# Patient Record
Sex: MALE | Race: WHITE | Employment: FULL TIME | URBAN - METROPOLITAN AREA
[De-identification: names, ages, dates, MRNs, and addresses within clinical notes are randomized per-mention and may not be internally consistent; named-entity substitution may affect disease eponyms.]

---

## 2017-10-24 ENCOUNTER — APPOINTMENT (OUTPATIENT)
Dept: CARDIOLOGY | Facility: CLINIC | Age: 47
DRG: 247 | End: 2017-10-24
Payer: COMMERCIAL

## 2017-10-24 ENCOUNTER — HOSPITAL ENCOUNTER (INPATIENT)
Facility: CLINIC | Age: 47
LOS: 2 days | Discharge: HOME | DRG: 247 | End: 2017-10-26
Attending: EMERGENCY MEDICINE | Admitting: INTERNAL MEDICINE
Payer: COMMERCIAL

## 2017-10-24 ENCOUNTER — APPOINTMENT (OUTPATIENT)
Dept: GENERAL RADIOLOGY | Facility: CLINIC | Age: 47
DRG: 247 | End: 2017-10-24
Attending: EMERGENCY MEDICINE
Payer: COMMERCIAL

## 2017-10-24 ENCOUNTER — TRANSFERRED RECORDS (OUTPATIENT)
Dept: HEALTH INFORMATION MANAGEMENT | Facility: CLINIC | Age: 47
End: 2017-10-24

## 2017-10-24 DIAGNOSIS — I21.11 STEMI INVOLVING RIGHT CORONARY ARTERY (H): ICD-10-CM

## 2017-10-24 DIAGNOSIS — E78.5 HYPERLIPIDEMIA LDL GOAL <70: ICD-10-CM

## 2017-10-24 DIAGNOSIS — I21.11 ST ELEVATION MYOCARDIAL INFARCTION INVOLVING RIGHT CORONARY ARTERY (H): ICD-10-CM

## 2017-10-24 DIAGNOSIS — I21.3 STEMI (ST ELEVATION MYOCARDIAL INFARCTION) (H): Primary | ICD-10-CM

## 2017-10-24 LAB
ANION GAP SERPL CALCULATED.3IONS-SCNC: 14 MMOL/L (ref 3–14)
BASOPHILS # BLD AUTO: 0 10E9/L (ref 0–0.2)
BASOPHILS NFR BLD AUTO: 0.3 %
BUN SERPL-MCNC: 15 MG/DL (ref 7–30)
CALCIUM SERPL-MCNC: 9 MG/DL (ref 8.5–10.1)
CHLORIDE SERPL-SCNC: 99 MMOL/L (ref 94–109)
CO2 SERPL-SCNC: 25 MMOL/L (ref 20–32)
CREAT SERPL-MCNC: 0.96 MG/DL (ref 0.66–1.25)
DIFFERENTIAL METHOD BLD: NORMAL
EOSINOPHIL # BLD AUTO: 0.1 10E9/L (ref 0–0.7)
EOSINOPHIL NFR BLD AUTO: 1 %
ERYTHROCYTE [DISTWIDTH] IN BLOOD BY AUTOMATED COUNT: 13.3 % (ref 10–15)
GFR SERPL CREATININE-BSD FRML MDRD: 83 ML/MIN/1.7M2
GLUCOSE SERPL-MCNC: 147 MG/DL (ref 70–99)
HCT VFR BLD AUTO: 41.9 % (ref 40–53)
HGB BLD-MCNC: 15 G/DL (ref 13.3–17.7)
IMM GRANULOCYTES # BLD: 0 10E9/L (ref 0–0.4)
IMM GRANULOCYTES NFR BLD: 0.4 %
INR PPP: 0.92 (ref 0.86–1.14)
INTERPRETATION ECG - MUSE: NORMAL
KCT BLD-ACNC: 354 SEC (ref 105–167)
LYMPHOCYTES # BLD AUTO: 4.1 10E9/L (ref 0.8–5.3)
LYMPHOCYTES NFR BLD AUTO: 39.2 %
MCH RBC QN AUTO: 30 PG (ref 26.5–33)
MCHC RBC AUTO-ENTMCNC: 35.8 G/DL (ref 31.5–36.5)
MCV RBC AUTO: 84 FL (ref 78–100)
MONOCYTES # BLD AUTO: 0.8 10E9/L (ref 0–1.3)
MONOCYTES NFR BLD AUTO: 7.6 %
NEUTROPHILS # BLD AUTO: 5.3 10E9/L (ref 1.6–8.3)
NEUTROPHILS NFR BLD AUTO: 51.5 %
NRBC # BLD AUTO: 0 10*3/UL
NRBC BLD AUTO-RTO: 0 /100
PLATELET # BLD AUTO: 277 10E9/L (ref 150–450)
POTASSIUM SERPL-SCNC: 3 MMOL/L (ref 3.4–5.3)
RBC # BLD AUTO: 5 10E12/L (ref 4.4–5.9)
SODIUM SERPL-SCNC: 138 MMOL/L (ref 133–144)
TROPONIN I SERPL-MCNC: 18.61 UG/L (ref 0–0.04)
TROPONIN I SERPL-MCNC: 2.92 UG/L (ref 0–0.04)
TROPONIN I SERPL-MCNC: <0.015 UG/L (ref 0–0.04)
WBC # BLD AUTO: 10.4 10E9/L (ref 4–11)

## 2017-10-24 PROCEDURE — B2111ZZ FLUOROSCOPY OF MULTIPLE CORONARY ARTERIES USING LOW OSMOLAR CONTRAST: ICD-10-PCS | Performed by: INTERNAL MEDICINE

## 2017-10-24 PROCEDURE — C1769 GUIDE WIRE: HCPCS

## 2017-10-24 PROCEDURE — 027035Z DILATION OF CORONARY ARTERY, ONE ARTERY WITH TWO DRUG-ELUTING INTRALUMINAL DEVICES, PERCUTANEOUS APPROACH: ICD-10-PCS | Performed by: INTERNAL MEDICINE

## 2017-10-24 PROCEDURE — 99152 MOD SED SAME PHYS/QHP 5/>YRS: CPT | Mod: GC | Performed by: INTERNAL MEDICINE

## 2017-10-24 PROCEDURE — 27210946 ZZH KIT HC TOTES DISP CR8

## 2017-10-24 PROCEDURE — 27211089 ZZH KIT ACIST INJECTOR CR3

## 2017-10-24 PROCEDURE — 21000000 ZZH R&B IMCU HEART CARE

## 2017-10-24 PROCEDURE — 71010 XR CHEST PORT 1 VW: CPT

## 2017-10-24 PROCEDURE — 25000132 ZZH RX MED GY IP 250 OP 250 PS 637

## 2017-10-24 PROCEDURE — 92941 PRQ TRLML REVSC TOT OCCL AMI: CPT | Mod: RC | Performed by: INTERNAL MEDICINE

## 2017-10-24 PROCEDURE — 25000128 H RX IP 250 OP 636: Performed by: PHYSICIAN ASSISTANT

## 2017-10-24 PROCEDURE — C1887 CATHETER, GUIDING: HCPCS

## 2017-10-24 PROCEDURE — 27210892 ZZH CATH CR4

## 2017-10-24 PROCEDURE — 80048 BASIC METABOLIC PNL TOTAL CA: CPT | Performed by: EMERGENCY MEDICINE

## 2017-10-24 PROCEDURE — 4A023N7 MEASUREMENT OF CARDIAC SAMPLING AND PRESSURE, LEFT HEART, PERCUTANEOUS APPROACH: ICD-10-PCS | Performed by: INTERNAL MEDICINE

## 2017-10-24 PROCEDURE — 93010 ELECTROCARDIOGRAM REPORT: CPT | Performed by: INTERNAL MEDICINE

## 2017-10-24 PROCEDURE — C1874 STENT, COATED/COV W/DEL SYS: HCPCS

## 2017-10-24 PROCEDURE — 27210856 ZZH ACCESS HEART CATH CR2

## 2017-10-24 PROCEDURE — 25000128 H RX IP 250 OP 636: Performed by: INTERNAL MEDICINE

## 2017-10-24 PROCEDURE — 27210759 ZZH DEVICE INFLATION CR6

## 2017-10-24 PROCEDURE — 93458 L HRT ARTERY/VENTRICLE ANGIO: CPT | Mod: XU

## 2017-10-24 PROCEDURE — 84484 ASSAY OF TROPONIN QUANT: CPT | Performed by: EMERGENCY MEDICINE

## 2017-10-24 PROCEDURE — C9606 PERC D-E COR REVASC W AMI S: HCPCS

## 2017-10-24 PROCEDURE — 93458 L HRT ARTERY/VENTRICLE ANGIO: CPT | Mod: 26 | Performed by: INTERNAL MEDICINE

## 2017-10-24 PROCEDURE — 25000128 H RX IP 250 OP 636: Performed by: EMERGENCY MEDICINE

## 2017-10-24 PROCEDURE — C1757 CATH, THROMBECTOMY/EMBOLECT: HCPCS

## 2017-10-24 PROCEDURE — 25000132 ZZH RX MED GY IP 250 OP 250 PS 637: Performed by: EMERGENCY MEDICINE

## 2017-10-24 PROCEDURE — 99152 MOD SED SAME PHYS/QHP 5/>YRS: CPT

## 2017-10-24 PROCEDURE — 25000125 ZZHC RX 250: Performed by: INTERNAL MEDICINE

## 2017-10-24 PROCEDURE — 99291 CRITICAL CARE FIRST HOUR: CPT | Performed by: INTERNAL MEDICINE

## 2017-10-24 PROCEDURE — 93005 ELECTROCARDIOGRAM TRACING: CPT

## 2017-10-24 PROCEDURE — 36415 COLL VENOUS BLD VENIPUNCTURE: CPT

## 2017-10-24 PROCEDURE — 25000132 ZZH RX MED GY IP 250 OP 250 PS 637: Performed by: INTERNAL MEDICINE

## 2017-10-24 PROCEDURE — 85025 COMPLETE CBC W/AUTO DIFF WBC: CPT | Performed by: EMERGENCY MEDICINE

## 2017-10-24 PROCEDURE — 85610 PROTHROMBIN TIME: CPT | Performed by: EMERGENCY MEDICINE

## 2017-10-24 PROCEDURE — 85347 COAGULATION TIME ACTIVATED: CPT

## 2017-10-24 PROCEDURE — 99285 EMERGENCY DEPT VISIT HI MDM: CPT | Mod: 25

## 2017-10-24 PROCEDURE — 27210795 ZZH PAD DEFIB QUICK CR4

## 2017-10-24 PROCEDURE — 84484 ASSAY OF TROPONIN QUANT: CPT

## 2017-10-24 PROCEDURE — C1725 CATH, TRANSLUMIN NON-LASER: HCPCS

## 2017-10-24 PROCEDURE — 99153 MOD SED SAME PHYS/QHP EA: CPT

## 2017-10-24 PROCEDURE — 27210787 ZZH MANIFOLD CR2

## 2017-10-24 PROCEDURE — 27210914 ZZH SHEATH CR8

## 2017-10-24 RX ORDER — ASPIRIN 81 MG/1
324 TABLET, CHEWABLE ORAL ONCE
Status: DISCONTINUED | OUTPATIENT
Start: 2017-10-24 | End: 2017-10-24

## 2017-10-24 RX ORDER — NITROGLYCERIN 5 MG/ML
100-500 VIAL (ML) INTRAVENOUS
Status: COMPLETED | OUTPATIENT
Start: 2017-10-24 | End: 2017-10-24

## 2017-10-24 RX ORDER — MORPHINE SULFATE 2 MG/ML
1-2 INJECTION, SOLUTION INTRAMUSCULAR; INTRAVENOUS EVERY 5 MIN PRN
Status: DISCONTINUED | OUTPATIENT
Start: 2017-10-24 | End: 2017-10-24 | Stop reason: HOSPADM

## 2017-10-24 RX ORDER — IOPAMIDOL 755 MG/ML
150 INJECTION, SOLUTION INTRAVASCULAR ONCE
Status: COMPLETED | OUTPATIENT
Start: 2017-10-24 | End: 2017-10-24

## 2017-10-24 RX ORDER — ASPIRIN 81 MG/1
81-324 TABLET, CHEWABLE ORAL
Status: DISCONTINUED | OUTPATIENT
Start: 2017-10-24 | End: 2017-10-24 | Stop reason: HOSPADM

## 2017-10-24 RX ORDER — POTASSIUM CHLORIDE 1.5 G/1.58G
20-40 POWDER, FOR SOLUTION ORAL
Status: DISCONTINUED | OUTPATIENT
Start: 2017-10-24 | End: 2017-10-26 | Stop reason: HOSPADM

## 2017-10-24 RX ORDER — POTASSIUM CL/LIDO/0.9 % NACL 10MEQ/0.1L
10 INTRAVENOUS SOLUTION, PIGGYBACK (ML) INTRAVENOUS
Status: DISCONTINUED | OUTPATIENT
Start: 2017-10-24 | End: 2017-10-26 | Stop reason: HOSPADM

## 2017-10-24 RX ORDER — ONDANSETRON 2 MG/ML
4 INJECTION INTRAMUSCULAR; INTRAVENOUS EVERY 4 HOURS PRN
Status: DISCONTINUED | OUTPATIENT
Start: 2017-10-24 | End: 2017-10-24 | Stop reason: HOSPADM

## 2017-10-24 RX ORDER — PHENYLEPHRINE HCL IN 0.9% NACL 1 MG/10 ML
20-100 SYRINGE (ML) INTRAVENOUS
Status: DISCONTINUED | OUTPATIENT
Start: 2017-10-24 | End: 2017-10-24 | Stop reason: HOSPADM

## 2017-10-24 RX ORDER — ONDANSETRON 2 MG/ML
4 INJECTION INTRAMUSCULAR; INTRAVENOUS EVERY 6 HOURS PRN
Status: DISCONTINUED | OUTPATIENT
Start: 2017-10-24 | End: 2017-10-26 | Stop reason: HOSPADM

## 2017-10-24 RX ORDER — POTASSIUM CHLORIDE 1500 MG/1
20-40 TABLET, EXTENDED RELEASE ORAL
Status: DISCONTINUED | OUTPATIENT
Start: 2017-10-24 | End: 2017-10-26 | Stop reason: HOSPADM

## 2017-10-24 RX ORDER — POTASSIUM CHLORIDE 29.8 MG/ML
20 INJECTION INTRAVENOUS
Status: DISCONTINUED | OUTPATIENT
Start: 2017-10-24 | End: 2017-10-24 | Stop reason: HOSPADM

## 2017-10-24 RX ORDER — HYDRALAZINE HYDROCHLORIDE 20 MG/ML
10-20 INJECTION INTRAMUSCULAR; INTRAVENOUS
Status: DISCONTINUED | OUTPATIENT
Start: 2017-10-24 | End: 2017-10-24 | Stop reason: HOSPADM

## 2017-10-24 RX ORDER — FUROSEMIDE 10 MG/ML
20-100 INJECTION INTRAMUSCULAR; INTRAVENOUS
Status: DISCONTINUED | OUTPATIENT
Start: 2017-10-24 | End: 2017-10-24 | Stop reason: HOSPADM

## 2017-10-24 RX ORDER — AMLODIPINE BESYLATE 2.5 MG/1
2.5 TABLET ORAL
Status: DISCONTINUED | OUTPATIENT
Start: 2017-10-24 | End: 2017-10-26 | Stop reason: HOSPADM

## 2017-10-24 RX ORDER — NITROGLYCERIN 20 MG/100ML
.07-2 INJECTION INTRAVENOUS CONTINUOUS PRN
Status: DISCONTINUED | OUTPATIENT
Start: 2017-10-24 | End: 2017-10-24 | Stop reason: HOSPADM

## 2017-10-24 RX ORDER — PROMETHAZINE HYDROCHLORIDE 25 MG/ML
6.25-25 INJECTION, SOLUTION INTRAMUSCULAR; INTRAVENOUS EVERY 4 HOURS PRN
Status: DISCONTINUED | OUTPATIENT
Start: 2017-10-24 | End: 2017-10-24 | Stop reason: HOSPADM

## 2017-10-24 RX ORDER — LISINOPRIL/HYDROCHLOROTHIAZIDE 10-12.5 MG
1 TABLET ORAL AT BEDTIME
Status: ON HOLD | COMMUNITY
End: 2017-10-26

## 2017-10-24 RX ORDER — ADENOSINE 3 MG/ML
12-12000 INJECTION, SOLUTION INTRAVENOUS
Status: DISCONTINUED | OUTPATIENT
Start: 2017-10-24 | End: 2017-10-24 | Stop reason: HOSPADM

## 2017-10-24 RX ORDER — LORAZEPAM 2 MG/ML
.5-2 INJECTION INTRAMUSCULAR EVERY 4 HOURS PRN
Status: DISCONTINUED | OUTPATIENT
Start: 2017-10-24 | End: 2017-10-24 | Stop reason: HOSPADM

## 2017-10-24 RX ORDER — POTASSIUM CHLORIDE 7.45 MG/ML
10 INJECTION INTRAVENOUS
Status: DISCONTINUED | OUTPATIENT
Start: 2017-10-24 | End: 2017-10-26 | Stop reason: HOSPADM

## 2017-10-24 RX ORDER — ACETAMINOPHEN 325 MG/1
325-650 TABLET ORAL EVERY 4 HOURS PRN
Status: DISCONTINUED | OUTPATIENT
Start: 2017-10-24 | End: 2017-10-26 | Stop reason: HOSPADM

## 2017-10-24 RX ORDER — NICARDIPINE HYDROCHLORIDE 2.5 MG/ML
100 INJECTION INTRAVENOUS
Status: DISCONTINUED | OUTPATIENT
Start: 2017-10-24 | End: 2017-10-24 | Stop reason: HOSPADM

## 2017-10-24 RX ORDER — NALOXONE HYDROCHLORIDE 0.4 MG/ML
0.4 INJECTION, SOLUTION INTRAMUSCULAR; INTRAVENOUS; SUBCUTANEOUS EVERY 5 MIN PRN
Status: DISCONTINUED | OUTPATIENT
Start: 2017-10-24 | End: 2017-10-24 | Stop reason: HOSPADM

## 2017-10-24 RX ORDER — FLUMAZENIL 0.1 MG/ML
0.2 INJECTION, SOLUTION INTRAVENOUS
Status: DISCONTINUED | OUTPATIENT
Start: 2017-10-24 | End: 2017-10-24 | Stop reason: HOSPADM

## 2017-10-24 RX ORDER — ATROPINE SULFATE 0.1 MG/ML
.5-1 INJECTION INTRAVENOUS
Status: DISCONTINUED | OUTPATIENT
Start: 2017-10-24 | End: 2017-10-24 | Stop reason: HOSPADM

## 2017-10-24 RX ORDER — DIPHENHYDRAMINE HYDROCHLORIDE 50 MG/ML
25-50 INJECTION INTRAMUSCULAR; INTRAVENOUS
Status: DISCONTINUED | OUTPATIENT
Start: 2017-10-24 | End: 2017-10-24 | Stop reason: HOSPADM

## 2017-10-24 RX ORDER — METOPROLOL TARTRATE 1 MG/ML
5 INJECTION, SOLUTION INTRAVENOUS EVERY 5 MIN PRN
Status: DISCONTINUED | OUTPATIENT
Start: 2017-10-24 | End: 2017-10-24 | Stop reason: HOSPADM

## 2017-10-24 RX ORDER — CLOPIDOGREL 300 MG/1
300-600 TABLET, FILM COATED ORAL
Status: DISCONTINUED | OUTPATIENT
Start: 2017-10-24 | End: 2017-10-24 | Stop reason: HOSPADM

## 2017-10-24 RX ORDER — LIDOCAINE HYDROCHLORIDE 10 MG/ML
30 INJECTION, SOLUTION EPIDURAL; INFILTRATION; INTRACAUDAL; PERINEURAL
Status: DISCONTINUED | OUTPATIENT
Start: 2017-10-24 | End: 2017-10-24 | Stop reason: HOSPADM

## 2017-10-24 RX ORDER — SODIUM NITROPRUSSIDE 25 MG/ML
100-200 INJECTION INTRAVENOUS
Status: DISCONTINUED | OUTPATIENT
Start: 2017-10-24 | End: 2017-10-24 | Stop reason: HOSPADM

## 2017-10-24 RX ORDER — ASPIRIN 81 MG/1
81 TABLET ORAL DAILY
Status: DISCONTINUED | OUTPATIENT
Start: 2017-10-24 | End: 2017-10-26 | Stop reason: HOSPADM

## 2017-10-24 RX ORDER — LIDOCAINE HYDROCHLORIDE 10 MG/ML
1-10 INJECTION, SOLUTION EPIDURAL; INFILTRATION; INTRACAUDAL; PERINEURAL
Status: COMPLETED | OUTPATIENT
Start: 2017-10-24 | End: 2017-10-24

## 2017-10-24 RX ORDER — HEPARIN SODIUM 1000 [USP'U]/ML
1000-10000 INJECTION, SOLUTION INTRAVENOUS; SUBCUTANEOUS EVERY 5 MIN PRN
Status: DISCONTINUED | OUTPATIENT
Start: 2017-10-24 | End: 2017-10-24 | Stop reason: HOSPADM

## 2017-10-24 RX ORDER — FLUMAZENIL 0.1 MG/ML
0.2 INJECTION, SOLUTION INTRAVENOUS
Status: ACTIVE | OUTPATIENT
Start: 2017-10-24 | End: 2017-10-25

## 2017-10-24 RX ORDER — ATROPINE SULFATE 0.1 MG/ML
0.5 INJECTION INTRAVENOUS EVERY 5 MIN PRN
Status: ACTIVE | OUTPATIENT
Start: 2017-10-24 | End: 2017-10-25

## 2017-10-24 RX ORDER — LIDOCAINE 40 MG/G
CREAM TOPICAL
Status: DISCONTINUED | OUTPATIENT
Start: 2017-10-24 | End: 2017-10-26 | Stop reason: HOSPADM

## 2017-10-24 RX ORDER — PRASUGREL 10 MG/1
10-60 TABLET, FILM COATED ORAL
Status: DISCONTINUED | OUTPATIENT
Start: 2017-10-24 | End: 2017-10-24 | Stop reason: HOSPADM

## 2017-10-24 RX ORDER — LISINOPRIL 5 MG/1
5 TABLET ORAL DAILY
Status: DISCONTINUED | OUTPATIENT
Start: 2017-10-24 | End: 2017-10-24

## 2017-10-24 RX ORDER — DOBUTAMINE HYDROCHLORIDE 200 MG/100ML
2-20 INJECTION INTRAVENOUS CONTINUOUS PRN
Status: DISCONTINUED | OUTPATIENT
Start: 2017-10-24 | End: 2017-10-24 | Stop reason: HOSPADM

## 2017-10-24 RX ORDER — HYDROCODONE BITARTRATE AND ACETAMINOPHEN 5; 325 MG/1; MG/1
1-2 TABLET ORAL EVERY 4 HOURS PRN
Status: DISCONTINUED | OUTPATIENT
Start: 2017-10-24 | End: 2017-10-26 | Stop reason: HOSPADM

## 2017-10-24 RX ORDER — NIFEDIPINE 10 MG/1
10 CAPSULE ORAL
Status: DISCONTINUED | OUTPATIENT
Start: 2017-10-24 | End: 2017-10-24 | Stop reason: HOSPADM

## 2017-10-24 RX ORDER — METHYLPREDNISOLONE SODIUM SUCCINATE 125 MG/2ML
125 INJECTION, POWDER, LYOPHILIZED, FOR SOLUTION INTRAMUSCULAR; INTRAVENOUS
Status: DISCONTINUED | OUTPATIENT
Start: 2017-10-24 | End: 2017-10-24 | Stop reason: HOSPADM

## 2017-10-24 RX ORDER — NALOXONE HYDROCHLORIDE 0.4 MG/ML
.1-.4 INJECTION, SOLUTION INTRAMUSCULAR; INTRAVENOUS; SUBCUTANEOUS
Status: DISCONTINUED | OUTPATIENT
Start: 2017-10-24 | End: 2017-10-26 | Stop reason: HOSPADM

## 2017-10-24 RX ORDER — PROTAMINE SULFATE 10 MG/ML
1-5 INJECTION, SOLUTION INTRAVENOUS
Status: DISCONTINUED | OUTPATIENT
Start: 2017-10-24 | End: 2017-10-24 | Stop reason: HOSPADM

## 2017-10-24 RX ORDER — SODIUM CHLORIDE 9 MG/ML
INJECTION, SOLUTION INTRAVENOUS CONTINUOUS
Status: ACTIVE | OUTPATIENT
Start: 2017-10-24 | End: 2017-10-24

## 2017-10-24 RX ORDER — NALOXONE HYDROCHLORIDE 0.4 MG/ML
.2-.4 INJECTION, SOLUTION INTRAMUSCULAR; INTRAVENOUS; SUBCUTANEOUS
Status: ACTIVE | OUTPATIENT
Start: 2017-10-24 | End: 2017-10-25

## 2017-10-24 RX ORDER — DEXTROSE MONOHYDRATE 25 G/50ML
12.5-5 INJECTION, SOLUTION INTRAVENOUS EVERY 30 MIN PRN
Status: DISCONTINUED | OUTPATIENT
Start: 2017-10-24 | End: 2017-10-24 | Stop reason: HOSPADM

## 2017-10-24 RX ORDER — EPINEPHRINE 1 MG/ML
0.3 INJECTION, SOLUTION, CONCENTRATE INTRAVENOUS
Status: DISCONTINUED | OUTPATIENT
Start: 2017-10-24 | End: 2017-10-24 | Stop reason: HOSPADM

## 2017-10-24 RX ORDER — VERAPAMIL HYDROCHLORIDE 2.5 MG/ML
1-2.5 INJECTION, SOLUTION INTRAVENOUS
Status: COMPLETED | OUTPATIENT
Start: 2017-10-24 | End: 2017-10-24

## 2017-10-24 RX ORDER — CLOPIDOGREL BISULFATE 75 MG/1
75 TABLET ORAL
Status: DISCONTINUED | OUTPATIENT
Start: 2017-10-24 | End: 2017-10-24 | Stop reason: HOSPADM

## 2017-10-24 RX ORDER — NITROGLYCERIN 5 MG/ML
100-200 VIAL (ML) INTRAVENOUS
Status: DISCONTINUED | OUTPATIENT
Start: 2017-10-24 | End: 2017-10-24 | Stop reason: HOSPADM

## 2017-10-24 RX ORDER — NITROGLYCERIN 0.4 MG/1
0.4 TABLET SUBLINGUAL EVERY 5 MIN PRN
Status: DISCONTINUED | OUTPATIENT
Start: 2017-10-24 | End: 2017-10-24 | Stop reason: HOSPADM

## 2017-10-24 RX ORDER — POTASSIUM CHLORIDE 29.8 MG/ML
20 INJECTION INTRAVENOUS
Status: DISCONTINUED | OUTPATIENT
Start: 2017-10-24 | End: 2017-10-24

## 2017-10-24 RX ORDER — ENALAPRILAT 1.25 MG/ML
1.25-2.5 INJECTION INTRAVENOUS
Status: DISCONTINUED | OUTPATIENT
Start: 2017-10-24 | End: 2017-10-24 | Stop reason: HOSPADM

## 2017-10-24 RX ORDER — HEPARIN SODIUM 1000 [USP'U]/ML
INJECTION, SOLUTION INTRAVENOUS; SUBCUTANEOUS
Status: DISCONTINUED
Start: 2017-10-24 | End: 2017-10-24 | Stop reason: HOSPADM

## 2017-10-24 RX ORDER — ATORVASTATIN CALCIUM 40 MG/1
40 TABLET, FILM COATED ORAL DAILY
Status: DISCONTINUED | OUTPATIENT
Start: 2017-10-24 | End: 2017-10-25

## 2017-10-24 RX ORDER — FENTANYL CITRATE 50 UG/ML
25-50 INJECTION, SOLUTION INTRAMUSCULAR; INTRAVENOUS
Status: ACTIVE | OUTPATIENT
Start: 2017-10-24 | End: 2017-10-25

## 2017-10-24 RX ORDER — CARVEDILOL 6.25 MG/1
6.25 TABLET ORAL 2 TIMES DAILY
Status: DISCONTINUED | OUTPATIENT
Start: 2017-10-24 | End: 2017-10-26 | Stop reason: HOSPADM

## 2017-10-24 RX ORDER — PROTAMINE SULFATE 10 MG/ML
25-100 INJECTION, SOLUTION INTRAVENOUS EVERY 5 MIN PRN
Status: DISCONTINUED | OUTPATIENT
Start: 2017-10-24 | End: 2017-10-24 | Stop reason: HOSPADM

## 2017-10-24 RX ORDER — BUPIVACAINE HYDROCHLORIDE 2.5 MG/ML
1-10 INJECTION, SOLUTION EPIDURAL; INFILTRATION; INTRACAUDAL
Status: DISCONTINUED | OUTPATIENT
Start: 2017-10-24 | End: 2017-10-24 | Stop reason: HOSPADM

## 2017-10-24 RX ORDER — NITROGLYCERIN 0.4 MG/1
0.4 TABLET SUBLINGUAL EVERY 5 MIN PRN
Status: DISCONTINUED | OUTPATIENT
Start: 2017-10-24 | End: 2017-10-26 | Stop reason: HOSPADM

## 2017-10-24 RX ORDER — FENTANYL CITRATE 50 UG/ML
25-50 INJECTION, SOLUTION INTRAMUSCULAR; INTRAVENOUS
Status: DISCONTINUED | OUTPATIENT
Start: 2017-10-24 | End: 2017-10-24 | Stop reason: HOSPADM

## 2017-10-24 RX ORDER — ONDANSETRON 4 MG/1
4 TABLET, ORALLY DISINTEGRATING ORAL EVERY 6 HOURS PRN
Status: DISCONTINUED | OUTPATIENT
Start: 2017-10-24 | End: 2017-10-26 | Stop reason: HOSPADM

## 2017-10-24 RX ORDER — ASPIRIN 325 MG
325 TABLET ORAL
Status: DISCONTINUED | OUTPATIENT
Start: 2017-10-24 | End: 2017-10-24 | Stop reason: HOSPADM

## 2017-10-24 RX ORDER — DOPAMINE HYDROCHLORIDE 160 MG/100ML
2-20 INJECTION, SOLUTION INTRAVENOUS CONTINUOUS PRN
Status: DISCONTINUED | OUTPATIENT
Start: 2017-10-24 | End: 2017-10-24 | Stop reason: HOSPADM

## 2017-10-24 RX ORDER — POTASSIUM CHLORIDE 7.45 MG/ML
10 INJECTION INTRAVENOUS
Status: DISCONTINUED | OUTPATIENT
Start: 2017-10-24 | End: 2017-10-24 | Stop reason: HOSPADM

## 2017-10-24 RX ORDER — LISINOPRIL 10 MG/1
10 TABLET ORAL 2 TIMES DAILY
Status: DISCONTINUED | OUTPATIENT
Start: 2017-10-24 | End: 2017-10-26 | Stop reason: HOSPADM

## 2017-10-24 RX ADMIN — MIDAZOLAM HYDROCHLORIDE 1 MG: 1 INJECTION, SOLUTION INTRAMUSCULAR; INTRAVENOUS at 13:21

## 2017-10-24 RX ADMIN — Medication 10 MEQ: at 23:44

## 2017-10-24 RX ADMIN — TICAGRELOR 90 MG: 90 TABLET ORAL at 23:44

## 2017-10-24 RX ADMIN — VERAPAMIL HYDROCHLORIDE 2.5 MG: 2.5 INJECTION, SOLUTION INTRAVENOUS at 13:21

## 2017-10-24 RX ADMIN — CARVEDILOL 6.25 MG: 6.25 TABLET, FILM COATED ORAL at 20:25

## 2017-10-24 RX ADMIN — FENTANYL CITRATE 50 MCG: 50 INJECTION, SOLUTION INTRAMUSCULAR; INTRAVENOUS at 13:20

## 2017-10-24 RX ADMIN — FENTANYL CITRATE 50 MCG: 50 INJECTION, SOLUTION INTRAMUSCULAR; INTRAVENOUS at 13:39

## 2017-10-24 RX ADMIN — Medication 5000 UNITS: at 13:20

## 2017-10-24 RX ADMIN — NITROGLYCERIN 200 MCG: 5 INJECTION, SOLUTION INTRAVENOUS at 13:20

## 2017-10-24 RX ADMIN — Medication 10 MEQ: at 22:29

## 2017-10-24 RX ADMIN — MIDAZOLAM HYDROCHLORIDE 1 MG: 1 INJECTION, SOLUTION INTRAMUSCULAR; INTRAVENOUS at 13:39

## 2017-10-24 RX ADMIN — LISINOPRIL 5 MG: 5 TABLET ORAL at 16:56

## 2017-10-24 RX ADMIN — IOPAMIDOL 150 ML: 755 INJECTION, SOLUTION INTRAVASCULAR at 14:15

## 2017-10-24 RX ADMIN — LISINOPRIL 10 MG: 10 TABLET ORAL at 22:28

## 2017-10-24 RX ADMIN — RANITIDINE 150 MG: 150 TABLET ORAL at 20:25

## 2017-10-24 RX ADMIN — HEPARIN SODIUM 7000 UNITS: 1000 INJECTION, SOLUTION INTRAVENOUS; SUBCUTANEOUS at 13:00

## 2017-10-24 RX ADMIN — TICAGRELOR 180 MG: 90 TABLET ORAL at 12:59

## 2017-10-24 RX ADMIN — ATORVASTATIN CALCIUM 40 MG: 40 TABLET, FILM COATED ORAL at 20:26

## 2017-10-24 RX ADMIN — LIDOCAINE HYDROCHLORIDE 10 MG: 10 INJECTION, SOLUTION EPIDURAL; INFILTRATION; INTRACAUDAL; PERINEURAL at 13:20

## 2017-10-24 ASSESSMENT — ENCOUNTER SYMPTOMS: MYALGIAS: 1

## 2017-10-24 ASSESSMENT — ACTIVITIES OF DAILY LIVING (ADL)
DRESS: 0-->INDEPENDENT
TOILETING: 0-->INDEPENDENT
RETIRED_EATING: 0-->INDEPENDENT
BATHING: 0-->INDEPENDENT
RETIRED_COMMUNICATION: 0-->UNDERSTANDS/COMMUNICATES WITHOUT DIFFICULTY
FALL_HISTORY_WITHIN_LAST_SIX_MONTHS: NO
COGNITION: 0 - NO COGNITION ISSUES REPORTED
SWALLOWING: 0-->SWALLOWS FOODS/LIQUIDS WITHOUT DIFFICULTY
AMBULATION: 0-->INDEPENDENT
TRANSFERRING: 0-->INDEPENDENT

## 2017-10-24 NOTE — IP AVS SNAPSHOT
[facility]    [address]    [facility] [address]    Phone:  [phone]                                       After Visit Summary   10/24/2017    Sachin Xavier    MRN: [id number]           After Visit Summary Signature Page     I have received my discharge instructions, and my questions have been answered. I have discussed any challenges I see with this plan with the nurse or doctor.    ..........................................................................................................................................  Patient/Patient Representative Signature      ..........................................................................................................................................  Patient Representative Print Name and Relationship to Patient    ..................................................               ................................................  Date                                            Time    ..........................................................................................................................................  Reviewed by Signature/Title    ...................................................              ..............................................  Date                                                            Time

## 2017-10-24 NOTE — PHARMACY-ADMISSION MEDICATION HISTORY
Admission medication history interview status for the 10/24/2017  admission is complete. See EPIC admission navigator for prior to admission medications     Medication history source reliability:Good    Actions taken by pharmacist (provider contacted, etc):called [facility] for meds     Additional medication history information not noted on PTA med list : both of them    Medication reconciliation/reorder completed by provider prior to medication history? No    Time spent in this activity: 7 min    Prior to Admission medications    Medication Sig Last Dose Taking? Auth Provider   ATORVASTATIN CALCIUM PO Take 40 mg by mouth At Bedtime  Yes Unknown, Entered By History   lisinopril-hydrochlorothiazide (PRINZIDE/ZESTORETIC) 10-12.5 MG per tablet Take 1 tablet by mouth At Bedtime  Yes Unknown, Entered By History

## 2017-10-24 NOTE — ED PROVIDER NOTES
"  History     Chief Complaint:  Chest Pain     HPI   Sachin Xavier is a 47 year old male who presents to the emergency department today via EMS for evaluation of chest pain, left shoulder and arm pain that started 40 minutes prior to arrival. EMS EKG showed inferior STEMI and the patient was given 324 ASA and 3 nitroglycerin doses. The last blood pressure taken by EMS was 122/76. He is still in pain here, but is feeling improved. The patient has a family history of MI, but the patient does not have a past medical history of MI.    Allergies:  No Known Drug Allergies      Medications:   No current outpatient prescriptions on file.    Past Medical History:    Hyperlipidemia  Hypertension     Past Surgical History:    Hernia repair surgery  ACL repair    Family History:    MI    Social History:  The patient was accompanied to the ED by EMS.  Smoking Status: never  Alcohol Use: Yes     Review of Systems   Cardiovascular: Positive for chest pain.   Musculoskeletal: Positive for myalgias (left arm).   All other systems reviewed and are negative.    Physical Exam   First Vitals:  BP: 107/87  Heart Rate: 60  Temp: 98.9  F (37.2  C)  Resp: 10  Height: 172.7 cm (5' 8\")  Weight: 97.2 kg (214 lb 4.6 oz)  SpO2: 99 %    Physical Exam  Nursing note and vitals reviewed.  Constitutional:  Appears diaphoretic and pale.  uncomfortable.  HENT:   Nose:    Nose normal.   Mouth/Throat:  Mucosa is moist.  Eyes:    Conjunctivae are normal.      Pupils are equal, round, and reactive to light.      Right eye exhibits no discharge. Left eye exhibits no discharge.      No scleral icterus.   Cardiovascular:  Normal rate, regular rhythm.      Normal heart sounds and intact distal pulses.       No murmur heard.  Pulmonary/Chest:  Effort normal and breath sounds normal. No respiratory distress.     No wheezes. No rales. No chest wall tenderness. No stridor.   Abdominal:   Soft. No distension and no mass. No tenderness.      No rebound and no " guarding. No flank pain.  Musculoskeletal:  Normal range of motion.      No edema and no tenderness. No leg edema.                                      Neck supple, no midline cervical tenderness.   Neurological:   Alert, awake. No focal weakness.  Skin:    Diaphoretic. Pallor.     No erythema. Skin is warm and dry. No rash noted.  Psychiatric:   Behavior is normal. Judgment and thought content normal.     Emergency Department Course     ECG:  ECG taken at 1258, ECG read at 1259  Normal sinus rhythm  ST elevation consider inferior injury or acute infarct  Acute MI/STEMI  Consider right ventricular involvement in acute inferior infarct  Abnormal ECG  Rate 65 bpm. KY interval 184. QRS duration 88. QT/QTc 402/418. P-R-T axes 40,83,108.     Imaging:  Radiology findings were communicated with the patient who voiced understanding of the findings.  XR Chest Port 1 View  Transcutaneous pacer leads are seen. Cardiac silhouette  and pulmonary vasculature are within normal limits. No focal airspace  disease, pleural effusion or pneumothorax.  MARLEN OAKES MD    Laboratory:  Laboratory findings were communicated with the patient who voiced understanding of the findings.  Troponin (Collected 1258): <0.015   INR: 0.92   CBC: WBC 10.4, HGB 15.0,   BMP: K 3.0 (L), Glucose 147 (H), o/w WNL (Creatinine 0.96)    Interventions:  1259 ticagrelor 180 mg PO  1300 heparin 7,000 units IV  1316 aspirin 324 PO     Emergency Department Course:  Nursing notes and vitals reviewed.  1251 I entered the room.  1251 I performed an exam of the patient as documented above.   1252 Dr. Wakefield in the cath lab was notified for STEMI  The patient received the above intervention(s).   IV was inserted and blood was drawn for laboratory testing, results above.   The patient had a portable chest x-ray while in the emergency department, results above.    1257 I spoke with Dr. Wakefield in the catheter lab called and I discussed the patient's case with  them. He will be sent to cath lab for emergent treatment.  The patient was sent to cath lab.    Impression & Plan      Medical Decision Making:  The patient comes A Ron and inferior wall STEMI, Cath Lab was activated from the field. Our EKG confirms. Labs were obtained, portable chest x-ray shows a normal mediastinum. Brilinta was given and heparin per protocol, I talked to Dr. Wakefield who will be accepting him to the Cath Lab. They are ready for him now. Patient's systolic blood pressure was 107, he was still having pain, but because of this low blood pressure I did not want to give him nitroglycerin or morphine as it could drop his pressure more. He received aspirin in the ambulance.    Critical Care time was 25 minutes for this patient excluding procedures.     Diagnosis:    ICD-10-CM    1. STEMI (ST elevation myocardial infarction) (H) I21.3 CBC with platelets differential     INR     Basic metabolic panel     Troponin I    Inf wall     Disposition:   Admit to the catheter lab to Dr. Wakefield. Heparin bolus and Brilinta orally was given.    CMS Diagnoses: None     Scribe Disclosure:  Shon VALLADARES, am serving as a scribe at 12:57 PM on 10/24/2017 to document services personally performed by Jenifer Sylvester MD, based on my observations and the provider's statements to me.    EMERGENCY DEPARTMENT       Jenifer Sylvester MD  10/24/17 [id number]

## 2017-10-24 NOTE — ED NOTES
Bed: [facility]  Expected date:   Expected time:   Means of arrival:   Comments:  514  47 M stemi/inerior/nitro/asa  1253

## 2017-10-24 NOTE — PROVIDER NOTIFICATION
MD Notification    Notified Person:  MD    Notified Persons Name: Dr. Farnsworth    Notification Date/Time: 10/24/17 at 1624    Notification Interaction:  Paged Physician    Purpose of Notification: Trop 2.922 s/p PCI to RCA.     Orders Received:    Comments:

## 2017-10-24 NOTE — CONSULTS
Received Hospitalist Consult. Patient is a 48 yo with PMH HTN and DLD. Presented with chest pain with inferior STI changes on EKG. Taken to cath lab and s/p PCI to RCA. Chart reviewed and no active medical issues other than CAD which is being managed by primary service.. I have ordered K replacement for mild hypokalemia. Hospitalist will sign off. Please do no hesitate to re-consult us if additional concerns arise.    Mayela Orr PA-C  Hospitalist Service  [phone]

## 2017-10-24 NOTE — ED NOTES
Pt presents by EMS after pt called EMS for L sided chest pain and L arm pain. Pt has no hx of MI but reports he takes medications for htn and hyperlipedemia. EMS EKG showing inferior stemi and cath lab was activated from the field. Pt given 324 ASA and 3 nitro by EMS. Pt experiencing some relief from nitro but pain still 8/10. Pt vitally stable, skin pink/warm to touch. EKG showing inferior stemi as well. Pt given 180 mg brilinta and 7000 units heparin in ED.

## 2017-10-24 NOTE — H&P
"HPI:  The patient is a 47-year-old male with history of hypertension and hyperlipidemia who presented as an acute STEMI. He said he noticed some mild chest pain symptoms yesterday but his chest pain and left arm pain started 40 minutes before he called EMS. He had inferior ST elevation on his ECG and was sent emergently to the Cath Lab.    Past Medical History:   Diagnosis Date     Hyperlipidemia      Hypertension      Past Surgical History:   Procedure Laterality Date     HERNIA REPAIR       ORTHOPEDIC SURGERY      acl repair     Family History:  Father had an MI in his 50s    Social:    Remote history of tobacco    Review Of Systems  Skin: no new lesions notes  Eyes: no vision changes, no blurry vision  ENT: no hearing changes, no change in vocal quality  Resp: no dyspnea  CV: per HPI  GI: no diarrhea, constipation  : no dysuria, no hematuria  MS: no muscle or joint pains  Neuro: no new neuropathies, no strength or sensation deficits  Psych: no behavioral changes  Heme/Lymph/Immune: no abnormal bruising or swelling  Endo: no heat/cold intolerance    Physical Exam:  /83  Temp 98.9  F (37.2  C) (Oral)  Resp 22  Ht 1.727 m (5' 8\")  Wt 97.2 kg (214 lb 4.6 oz)  SpO2 94%  BMI 32.58 kg/m2    Const:  NAD, AOx3   HEENT:  EOMI, PERRLA, MMM   Resp: CTAB   CV: RRR, no m/r/g  GI:  Bowel sounds normal, NT/ND, no HSM or masses   MS: Full range of motion, no effusions  Skin: No concerning lesions, rashes or jaundice   Neuro: No focal deficit, normal gait, no tremor     Labs:  Reviewed in Epic    Imaging:  Reviewed in Epic    EKG  Inferior ST elevation    Assessment and Plan  48 y/o male who presented with acute STEMI, inferior ST changes on ECG. He had an acute thrombotic lesion in the RCA, s/p PCI.    ACS/CAD  -s/p STEMI  -ASA 81mg indefinitely  -ticagrelor 90mg BID  -atorva 40mg   -coreg and lisinopril started  -CMR to eval extent of infarct and for LV clot    HTN  -coreg and lisinopril    HLD  -atorva " 40mg    Diet: cardiac  GI ppx- ranitidine  Full Code    Staffed with Dr. Ashu Valentin MD, PhD  Cardiology Fellow

## 2017-10-24 NOTE — IP AVS SNAPSHOT
MRN:[id number]                      After Visit Summary   10/24/2017    Sachin Xavier    MRN: [id number]           Thank you!     Thank you for choosing [facility] for your care. Our goal is always to provide you with excellent care. Hearing back from our patients is one way we can continue to improve our services. Please take a few minutes to complete the written survey that you may receive in the mail after you visit with us. Thank you!        Patient Information     Date Of Birth          1970        Designated Caregiver       Most Recent Value    Caregiver    Will someone help with your care after discharge? no      About your hospital stay     You were admitted on:  October 24, 2017 You last received care in the:  [facility] Coronary Care Unit    You were discharged on:  October 26, 2017        Reason for your hospital stay       Heart attack and stenting                  Who to Call     For medical emergencies, please call 911.  For non-urgent questions about your medical care, please call your primary care provider or clinic, [phone]          Attending Provider     Provider Specialty    Jenifer Sylvester MD Emergency Medicine    [facility], Lashell Duarte MD Cardiology       Primary Care Provider Office Phone # Fax #    [facility] [phone]      After Care Instructions     Activity       Your activity upon discharge: {per cardiac rehab guidelines            Diet       Low saturated fat; low sodium (2000-3000mg per day)                  Follow-up Appointments     Follow Up and recommended labs and tests       Follow up appointment set up on 11/7 at 9:15am with  Gene Alatorre  [address] [facility].  2nd Floor  [place], [place]  #[phone]            Follow-up and recommended labs and tests        See avs                  Your next 10 appointments already scheduled     Nov 09, 2017  9:10 AM CST   LAB with  LAB   [facility] [place]  [facility] [place] ([facility])    [address] [place] [address] [address]  [facility] [id number] [phone]           Patient must bring picture ID. Patient should be prepared to give a urine specimen  Please do not eat 10-12 hours before your appointment if you are coming in fasting for labs on lipids, cholesterol, or glucose (sugar). Pregnant women should follow their Care Team instructions. Water with medications is okay. Do not drink coffee or other fluids. If you have concerns about taking  your medications, please ask at office or if scheduling via [employer], send a message by clicking on Secure Messaging, Message Your Care Team.            Nov 09, 2017 10:10 AM CST   Return Discharge with Esperanza Higginbotham PA-C   [facility] ([facility])    [address] [place], [place] [address]  [facility] [id number] [phone]              Additional Services     Cardiac Rehab Referral       Your provider has referred you to: [facility]: [facility] [phone]   [email or web address]            Follow-Up with Cardiologist                 Future tests that were ordered for you     Basic metabolic panel           Lipid Profile                 Further instructions from your care team       Cardiac Angiogram Discharge Instructions - Radial    After you go home:      Have an adult stay with you until tomorrow.    Drink extra fluids for 2 days.    You may resume your normal diet.    No smoking       For 24 hours - due to the sedation you received:    Relax and take it easy.    Do NOT make any important or legal decisions.    Do NOT drive or operate machines at home or at work.    Do NOT drink alcohol.    Care of Wrist Puncture Site:      For the first 24 hrs - check the puncture site every 1-2 hours while awake.    It is normal to have soreness at the puncture site and  mild tingling in your hand for up to 3 days.    Remove the bandaid after 24 hours. If there is minor oozing, apply another bandaid and remove it after 12 hours.    You may shower tomorrow.  Do NOT take a bath, or use a hot tub or pool for at least 3 days. Do NOT scrub the site. Do not use lotion or powder near the puncture site.           Activity:        For 2 days:     do not use your hand or arm to support your weight (such as rising from a chair)     do not bend your wrist (such as lifting a garage door).    do not lift more than 5 pounds or exercise your arm (such as tennis, golf or bowling).    Do NOT do any heavy activity such as exercise, lifting, or straining.     Bleeding:      If you start bleeding from the site in your wrist, sit down and press firmly on/above the site for 10 minutes.     Once bleeding stops, keep arm still for 2 hours.     Call [facility] as soon as you can.       Call 911 right away if you have heavy bleeding or bleeding that does not stop.      Medicines:      If you are taking antiplatelet medications such as Plavix, Brilinta or Effient, do not stop taking it until you talk to your cardiologist.        If you are on Metformin (Glucophage), do not restart it until you have blood tests (within 2 to 3 days after discharge).  After you have your blood drawn, you may restart the Metformin.     Take your medications, including blood thinners, unless your provider tells you not to.  If you take Coumadin (Warfarin), have your INR checked by your provider in  3-5 days. Call your clinic to schedule this.    If you have stopped any medicines, check with your provider about when to restart them.    Follow Up Appointments:      Follow up with [facility] Nurse Practitioner at [facility] of patient preference in 7-10 days.    Call the clinic if:      You have a large or growing hard lump around the site.    The site is red, swollen, hot or tender.    Blood or fluid is draining from the  "site.    You have chills or a fever greater than 101 F (38 C).    Your arm turns feels numb, cool or changes color.    You have hives, a rash or unusual itching.    Any questions or concerns.          [facility]:    [phone] UMP (7 days a week)            Pending Results     No orders found from 10/22/2017 to 10/25/2017.            Statement of Approval     Ordered          10/26/17 0923  I have reviewed and agree with all the recommendations and orders detailed in this document.  EFFECTIVE NOW     Approved and electronically signed by:  Haritha Salas APRN CNP             Admission Information     Date & Time Provider Department Dept. Phone    10/24/2017 Lashell Farnsworth MD [facility] Coronary Care Unit [phone]      Your Vitals Were     Blood Pressure Temperature Respirations Height Weight Pulse Oximetry    127/86 98.2  F (36.8  C) (Oral) 12 1.727 m (5' 8\") 94.4 kg (208 lb 1.8 oz) 98%    BMI (Body Mass Index)                   31.64 kg/m2           [employer] Information     vidIQ lets you send messages to your doctor, view your test results, renew your prescriptions, schedule appointments and more. To sign up, go to [email or web address] . Click on \"Log in\" on the left side of the screen, which will take you to the Welcome page. Then click on \"Sign up Now\" on the right side of the page.     You will be asked to enter the access code listed below, as well as some personal information. Please follow the directions to create your username and password.     Your access code is: [email or web address]  Expires: 2018 11:20 AM     Your access code will  in 90 days. If you need help or a new code, please call your [facility] clinic or [phone].        Care EveryWhere ID     This is your Care EveryWhere ID. This could be used by other organizations to access your [facility] medical records  [id number]        Equal Access to Services     [facility] " GAAR : Hadii aad ku hadjunieo Soseverianoali, waaxda luqadaha, qaybta kaalmada adeegyada, francis orantes hayangel chanavrilnikky nunez . So [facility] [phone].    ATENCIÓN: Si habla español, tiene a pickett disposición servicios gratuitos de asistencia lingüística. Llame al [phone].    We comply with applicable federal civil rights laws and [place] laws. We do not discriminate on the basis of race, color, national origin, age, disability, sex, sexual orientation, or gender identity.               Review of your medicines      START taking        Dose / Directions    aspirin 81 MG EC tablet   Used for:  ST elevation myocardial infarction involving right coronary artery (H)        Dose:  81 mg   Take 1 tablet (81 mg) by mouth daily   Refills:  0       carvedilol 6.25 MG tablet   Commonly known as:  COREG   Used for:  ST elevation myocardial infarction involving right coronary artery (H)   Notes to Patient:  You can take this w/ breakfast and dinner everyday        Dose:  6.25 mg   Take 1 tablet (6.25 mg) by mouth 2 times daily   Quantity:  180 tablet   Refills:  3       clopidogrel 75 MG tablet   Commonly known as:  PLAVIX   Used for:  ST elevation myocardial infarction involving right coronary artery (H)        Dose:  75 mg   Take 1 tablet (75 mg) by mouth daily   Quantity:  90 tablet   Refills:  3       lisinopril 10 MG tablet   Commonly known as:  PRINIVIL/ZESTRIL   Used for:  ST elevation myocardial infarction involving right coronary artery (H)        Dose:  10 mg   Take 1 tablet (10 mg) by mouth 2 times daily   Quantity:  180 tablet   Refills:  3       nitroGLYcerin 0.4 MG sublingual tablet   Commonly known as:  NITROSTAT   Used for:  ST elevation myocardial infarction involving right coronary artery (H)        For chest pain place 1 tablet under the tongue every 5 minutes for 3 doses. If symptoms persist 5 minutes after 1st dose call 911.   Quantity:  25 tablet   Refills:  3       ranitidine 150 MG tablet   Commonly  known as:  ZANTAC   Used for:  ST elevation myocardial infarction involving right coronary artery (H)   Notes to Patient:  Typically 9am and 9pm or 8am and 8pm; whichever timing works best for you.        Dose:  150 mg   Take 1 tablet (150 mg) by mouth every 12 hours   Quantity:  60 tablet   Refills:  3         CONTINUE these medicines which may have CHANGED, or have new prescriptions. If we are uncertain of the size of tablets/capsules you have at home, strength may be listed as something that might have changed.        Dose / Directions    atorvastatin 80 MG tablet   Commonly known as:  LIPITOR   This may have changed:    - medication strength  - how much to take  - when to take this   Used for:  Hyperlipidemia LDL goal <70        Dose:  80 mg   Take 1 tablet (80 mg) by mouth daily   Quantity:  90 tablet   Refills:  3         STOP taking     lisinopril-hydrochlorothiazide 10-12.5 MG per tablet   Commonly known as:  PRINZIDE/ZESTORETIC                Where to get your medicines      These medications were sent to [facility] - [address]  [address] [facility] [address], [place] [address]     Phone:  [phone]     atorvastatin 80 MG tablet    carvedilol 6.25 MG tablet    clopidogrel 75 MG tablet    lisinopril 10 MG tablet    nitroGLYcerin 0.4 MG sublingual tablet    ranitidine 150 MG tablet         Some of these will need a paper prescription and others can be bought over the counter. Ask your nurse if you have questions.     You don't need a prescription for these medications     aspirin 81 MG EC tablet                Protect others around you: Learn how to safely use, store and throw away your medicines at www.disposemymeds.org.             Medication List: This is a list of all your medications and when to take them. Check marks below indicate your daily home schedule. Keep this list as a reference.      Medications           Morning Afternoon Evening Bedtime As Needed    aspirin 81  MG EC tablet   Take 1 tablet (81 mg) by mouth daily   Last time this was given:  81 mg on 10/26/2017  8:55 AM   Next Dose Due:  Tomorrow (10/27) in AM                                   atorvastatin 80 MG tablet   Commonly known as:  LIPITOR   Take 1 tablet (80 mg) by mouth daily   Last time this was given:  80 mg on 10/25/2017  8:26 PM   Next Dose Due:  TONIGHT (10/26) at bedtime                                     carvedilol 6.25 MG tablet   Commonly known as:  COREG   Take 1 tablet (6.25 mg) by mouth 2 times daily   Last time this was given:  6.25 mg on 10/26/2017  8:55 AM   Next Dose Due:  TONIGHT (10/26) w/ dinner   Notes to Patient:  You can take this w/ breakfast and dinner everyday                                      clopidogrel 75 MG tablet   Commonly known as:  PLAVIX   Take 1 tablet (75 mg) by mouth daily   Last time this was given:  75 mg on 10/26/2017  8:55 AM   Last time this was given:  Tomorrow (10/27) in AM                                   lisinopril 10 MG tablet   Commonly known as:  PRINIVIL/ZESTRIL   Take 1 tablet (10 mg) by mouth 2 times daily   Last time this was given:  10 mg on 10/26/2017  8:55 AM   Next Dose Due:  TONIGHT (10/26) w/ dinner                                      nitroGLYcerin 0.4 MG sublingual tablet   Commonly known as:  NITROSTAT   For chest pain place 1 tablet under the tongue every 5 minutes for 3 doses. If symptoms persist 5 minutes after 1st dose call 911.                                   ranitidine 150 MG tablet   Commonly known as:  ZANTAC   Take 1 tablet (150 mg) by mouth every 12 hours   Last time this was given:  150 mg on 10/26/2017  8:55 AM   Next Dose Due:  TONIGHT (10/26) at bedtime   Notes to Patient:  Typically 9am and 9pm or 8am and 8pm; whichever timing works best for you.                                                More Information        Patient Education    Carvedilol Oral capsule, extended-release    Carvedilol Oral tablet  Carvedilol Oral  tablet  What is this medicine?  CARVEDILOL (JACLYN ve dil ol) is a beta-blocker. Beta-blockers reduce the workload on the heart and help it to beat more regularly. This medicine is used to treat high blood pressure and heart failure.  This medicine may be used for other purposes; ask your health care provider or pharmacist if you have questions.  What should I tell my health care provider before I take this medicine?  They need to know if you have any of these conditions:    circulation problems    diabetes    history of heart attack or heart disease    liver disease    lung or breathing disease, like asthma or emphysema    pheochromocytoma    slow or irregular heartbeat    thyroid disease    an unusual or allergic reaction to carvedilol, other beta-blockers, medicines, foods, dyes, or preservatives    pregnant or trying to get pregnant    breast-feeding  How should I use this medicine?  Take this medicine by mouth with a glass of water. Follow the directions on the prescription label. It is best to take the tablets with food. Take your doses at regular intervals. Do not take your medicine more often than directed. Do not stop taking except on the advice of your doctor or health care professional.  Talk to your pediatrician regarding the use of this medicine in children. Special care may be needed.  Overdosage: If you think you have taken too much of this medicine contact a poison control center or emergency room at once.  NOTE: This medicine is only for you. Do not share this medicine with others.  What if I miss a dose?  If you miss a dose, take it as soon as you can. If it is almost time for your next dose, take only that dose. Do not take double or extra doses.  What may interact with this medicine?  This medicine may interact with the following medications:    certain medicines for blood pressure, heart disease, irregular heart beat    certain medicines for depression, like fluoxetine or paroxetine    certain  medicines for diabetes, like glipizide or glyburide    cimetidine    clonidine    cyclosporine    digoxin    MAOIs like Carbex, Eldepryl, Marplan, Nardil, and Parnate    reserpine    rifampin  This list may not describe all possible interactions. Give your health care provider a list of all the medicines, herbs, non-prescription drugs, or dietary supplements you use. Also tell them if you smoke, drink alcohol, or use illegal drugs. Some items may interact with your medicine.  What should I watch for while using this medicine?  Check your heart rate and blood pressure regularly while you are taking this medicine. Ask your doctor or health care professional what your heart rate and blood pressure should be, and when you should contact him or her. Do not stop taking this medicine suddenly. This could lead to serious heart-related effects.  Contact your doctor or health care professional if you have difficulty breathing while taking this drug.  Check your weight daily. Ask your doctor or health care professional when you should notify him/her of any weight gain.  You may get drowsy or dizzy. Do not drive, use machinery, or do anything that requires mental alertness until you know how this medicine affects you. To reduce the risk of dizzy or fainting spells, do not sit or stand up quickly. Alcohol can make you more drowsy, and increase flushing and rapid heartbeats. Avoid alcoholic drinks.  If you have diabetes, check your blood sugar as directed. Tell your doctor if you have changes in your blood sugar while you are taking this medicine.  If you are going to have surgery, tell your doctor or health care professional that you are taking this medicine.  What side effects may I notice from receiving this medicine?  Side effects that you should report to your doctor or health care professional as soon as possible:    allergic reactions like skin rash, itching or hives, swelling of the face, lips, or tongue    breathing  problems    dark urine    irregular heartbeat    swollen legs or ankles    vomiting    yellowing of the eyes or skin  Side effects that usually do not require medical attention (report to your doctor or health care professional if they continue or are bothersome):    change in sex drive or performance    diarrhea    dry eyes (especially if wearing contact lenses)    dry, itching skin    headache    nausea    unusually tired  This list may not describe all possible side effects. Call your doctor for medical advice about side effects. You may report side effects to FDA at 6-111-XIV-2831.  Where should I keep my medicine?  Keep out of the reach of children.  Store at room temperature below 30 degrees C (86 degrees F). Protect from moisture. Keep container tightly closed. Throw away any unused medicine after the expiration date.  NOTE:This sheet is a summary. It may not cover all possible information. If you have questions about this medicine, talk to your doctor, pharmacist, or health care provider. Copyright  2016 Gold Standard                Lisinopril Oral tablet  What is this medicine?  LISINOPRIL (lyse IN oh pril) is an ACE inhibitor. This medicine is used to treat high blood pressure and heart failure. It is also used to protect the heart immediately after a heart attack.  This medicine may be used for other purposes; ask your health care provider or pharmacist if you have questions.  What should I tell my health care provider before I take this medicine?  They need to know if you have any of these conditions:    diabetes    heart or blood vessel disease    immune system disease like lupus or scleroderma    kidney disease    low blood pressure    previous swelling of the tongue, face, or lips with difficulty breathing, difficulty swallowing, hoarseness, or tightening of the throat    an unusual or allergic reaction to lisinopril, other ACE inhibitors, insect venom, foods, dyes, or preservatives    pregnant or  trying to get pregnant    breast-feeding  How should I use this medicine?  Take this medicine by mouth with a glass of water. Follow the directions on your prescription label. You may take this medicine with or without food. Take your medicine at regular intervals. Do not stop taking this medicine except on the advice of your doctor or health care professional.  Talk to your pediatrician regarding the use of this medicine in children. Special care may be needed. While this drug may be prescribed for children as young as 6 years of age for selected conditions, precautions do apply.  Overdosage: If you think you have taken too much of this medicine contact a poison control center or emergency room at once.  NOTE: This medicine is only for you. Do not share this medicine with others.  What if I miss a dose?  If you miss a dose, take it as soon as you can. If it is almost time for your next dose, take only that dose. Do not take double or extra doses.  What may interact with this medicine?    diuretics    lithium    NSAIDs, medicines for pain and inflammation, like ibuprofen or naproxen    over-the-counter herbal supplements like hawthorn    potassium salts or potassium supplements    salt substitutes  This list may not describe all possible interactions. Give your health care provider a list of all the medicines, herbs, non-prescription drugs, or dietary supplements you use. Also tell them if you smoke, drink alcohol, or use illegal drugs. Some items may interact with your medicine.  What should I watch for while using this medicine?  Visit your doctor or health care professional for regular check ups. Check your blood pressure as directed. Ask your doctor what your blood pressure should be, and when you should contact him or her. Call your doctor or health care professional if you notice an irregular or fast heart beat.  Women should inform their doctor if they wish to become pregnant or think they might be pregnant.  There is a potential for serious side effects to an unborn child. Talk to your health care professional or pharmacist for more information.  Check with your doctor or health care professional if you get an attack of severe diarrhea, nausea and vomiting, or if you sweat a lot. The loss of too much body fluid can make it dangerous for you to take this medicine.  You may get drowsy or dizzy. Do not drive, use machinery, or do anything that needs mental alertness until you know how this drug affects you. Do not stand or sit up quickly, especially if you are an older patient. This reduces the risk of dizzy or fainting spells. Alcohol can make you more drowsy and dizzy. Avoid alcoholic drinks.  Avoid salt substitutes unless you are told otherwise by your doctor or health care professional.  Do not treat yourself for coughs, colds, or pain while you are taking this medicine without asking your doctor or health care professional for advice. Some ingredients may increase your blood pressure.  What side effects may I notice from receiving this medicine?  Side effects that you should report to your doctor or health care professional as soon as possible:    abdominal pain with or without nausea or vomiting    allergic reactions like skin rash or hives, swelling of the hands, feet, face, lips, throat, or tongue    dark urine    difficulty breathing    dizzy, lightheaded or fainting spell    fever or sore throat    irregular heart beat, chest pain    pain or difficulty passing urine    redness, blistering, peeling or loosening of the skin, including inside the mouth    unusually weak    yellowing of the eyes or skin  Side effects that usually do not require medical attention (report to your doctor or health care professional if they continue or are bothersome):    change in taste    cough    decreased sexual function or desire    headache    sun sensitivity    tiredness  This list may not describe all possible side effects. Call  your doctor for medical advice about side effects. You may report side effects to FDA at 9-277-BTL-3909.  Where should I keep my medicine?  Keep out of the reach of children.  Store at room temperature between 15 and 30 degrees C (59 and 86 degrees F). Protect from moisture. Keep container tightly closed. Throw away any unused medicine after the expiration date.  NOTE:This sheet is a summary. It may not cover all possible information. If you have questions about this medicine, talk to your doctor, pharmacist, or health care provider. Copyright  2016 [employer]                Atorvastatin tablets  Brand Name: Lipitor  What is this medicine?  ATORVASTATIN (a TORE va sta tin) is known as a HMG-CoA reductase inhibitor or 'statin'. It lowers the level of cholesterol and triglycerides in the blood. This drug may also reduce the risk of heart attack, stroke, or other health problems in patients with risk factors for heart disease. Diet and lifestyle changes are often used with this drug.  How should I use this medicine?  Take this medicine by mouth with a glass of water. Follow the directions on the prescription label. You can take this medicine with or without food. Take your doses at regular intervals. Do not take your medicine more often than directed.  Talk to your pediatrician regarding the use of this medicine in children. While this drug may be prescribed for children as young as 10 years old for selected conditions, precautions do apply.  What side effects may I notice from receiving this medicine?  Side effects that you should report to your doctor or health care professional as soon as possible:    allergic reactions like skin rash, itching or hives, swelling of the face, lips, or tongue    dark urine    fever    joint pain    muscle cramps, pain    redness, blistering, peeling or loosening of the skin, including inside the mouth    trouble passing urine or change in the amount of urine    unusually weak or  tired    yellowing of eyes or skin  Side effects that usually do not require medical attention (report to your doctor or health care professional if they continue or are bothersome):    constipation    heartburn    stomach gas, pain, upset  What may interact with this medicine?  Do not take this medicine with any of the following medications:    red yeast rice    telaprevir    telithromycin    voriconazole  This medicine may also interact with the following medications:    alcohol    antiviral medicines for HIV or AIDS    boceprevir    certain antibiotics like clarithromycin, erythromycin, troleandomycin    certain medicines for cholesterol like fenofibrate or gemfibrozil    cimetidine    clarithromycin    colchicine    cyclosporine    digoxin    female hormones, like estrogens or progestins and birth control pills    grapefruit juice    medicines for fungal infections like fluconazole, itraconazole, ketoconazole    niacin    rifampin    spironolactone  What if I miss a dose?  If you miss a dose, take it as soon as you can. If it is almost time for your next dose, take only that dose. Do not take double or extra doses.  Where should I keep my medicine?  Keep out of the reach of children.  Store at room temperature between 20 to 25 degrees C (68 to 77 degrees F). Throw away any unused medicine after the expiration date.  What should I tell my health care provider before I take this medicine?  They need to know if you have any of these conditions:    frequently drink alcoholic beverages    history of stroke, TIA    kidney disease    liver disease    muscle aches or weakness    other medical condition    an unusual or allergic reaction to atorvastatin, other medicines, foods, dyes, or preservatives    pregnant or trying to get pregnant    breast-feeding  What should I watch for while using this medicine?  Visit your doctor or health care professional for regular check-ups. You may need regular tests to make sure your  liver is working properly.  Tell your doctor or health care professional right away if you get any unexplained muscle pain, tenderness, or weakness, especially if you also have a fever and tiredness. Your doctor or health care professional may tell you to stop taking this medicine if you develop muscle problems. If your muscle problems do not go away after stopping this medicine, contact your health care professional.  This drug is only part of a total heart-health program. Your doctor or a dietician can suggest a low-cholesterol and low-fat diet to help. Avoid alcohol and smoking, and keep a proper exercise schedule.  Do not use this drug if you are pregnant or breast-feeding. Serious side effects to an unborn child or to an infant are possible. Talk to your doctor or pharmacist for more information.  This medicine may affect blood sugar levels. If you have diabetes, check with your doctor or health care professional before you change your diet or the dose of your diabetic medicine.  If you are going to have surgery tell your health care professional that you are taking this drug.  NOTE:This sheet is a summary. It may not cover all possible information. If you have questions about this medicine, talk to your doctor, pharmacist, or health care provider. Copyright  2017 [employer]              Taking Aspirin Every Day  The basics  Taking aspirin every day can lower your risk of heart attack and stroke. Ask your doctor about taking aspirin if you:    Are a man age 45 or older    Are a woman age 55 or older    Smoke    Have high blood pressure, high cholesterol or diabetes    Have a family history of heart disease    Have already had a heart attack or stroke  For most people, aspirin is safe. But it's not right for everyone. Talk to your doctor before you start taking aspirin every day.  The benefits  Aspirin can reduce your risk of heart attack or stroke. It can:    Improve the flow of blood to the heart and  "brain    Help keep your arteries open if you have had a stroke or angioplasty  If you have already had a heart attack or stroke, daily aspirin can lower your risk of having another one.  Take action!  Your doctor can help you decide if aspirin is the right choice for you. Talk to your doctor about:    Your risk of heart attack    What kind of aspirin to take    How much to take    How often to take it  Be sure to tell your doctor about all the other medicines that you take, including vitamins.   For informational purposes only. Not to replace the advice of your health care provider. From \"Talk with Your Doctor about Taking Aspirin Every Day,\" by the U.S. Dept. of Health and Human Services (www.healthfinder.gov). [employer] 855720 - Rev 03/16.         "

## 2017-10-25 ENCOUNTER — APPOINTMENT (OUTPATIENT)
Dept: CARDIOLOGY | Facility: CLINIC | Age: 47
DRG: 247 | End: 2017-10-25
Payer: COMMERCIAL

## 2017-10-25 LAB
ANION GAP SERPL CALCULATED.3IONS-SCNC: 8 MMOL/L (ref 3–14)
BUN SERPL-MCNC: 14 MG/DL (ref 7–30)
CALCIUM SERPL-MCNC: 8.7 MG/DL (ref 8.5–10.1)
CHLORIDE SERPL-SCNC: 105 MMOL/L (ref 94–109)
CHOLEST SERPL-MCNC: 179 MG/DL
CO2 SERPL-SCNC: 26 MMOL/L (ref 20–32)
CREAT SERPL-MCNC: 0.83 MG/DL (ref 0.66–1.25)
ERYTHROCYTE [DISTWIDTH] IN BLOOD BY AUTOMATED COUNT: 13.5 % (ref 10–15)
GFR SERPL CREATININE-BSD FRML MDRD: >90 ML/MIN/1.7M2
GLUCOSE SERPL-MCNC: 146 MG/DL (ref 70–99)
HCT VFR BLD AUTO: 41.8 % (ref 40–53)
HDLC SERPL-MCNC: 39 MG/DL
HGB BLD-MCNC: 14.6 G/DL (ref 13.3–17.7)
INTERPRETATION ECG - MUSE: NORMAL
LDLC SERPL CALC-MCNC: 98 MG/DL
MCH RBC QN AUTO: 29.6 PG (ref 26.5–33)
MCHC RBC AUTO-ENTMCNC: 34.9 G/DL (ref 31.5–36.5)
MCV RBC AUTO: 85 FL (ref 78–100)
NONHDLC SERPL-MCNC: 140 MG/DL
PLATELET # BLD AUTO: 243 10E9/L (ref 150–450)
POTASSIUM SERPL-SCNC: 4 MMOL/L (ref 3.4–5.3)
RBC # BLD AUTO: 4.93 10E12/L (ref 4.4–5.9)
SODIUM SERPL-SCNC: 139 MMOL/L (ref 133–144)
TRIGL SERPL-MCNC: 210 MG/DL
TROPONIN I SERPL-MCNC: 16.18 UG/L (ref 0–0.04)
WBC # BLD AUTO: 10.9 10E9/L (ref 4–11)

## 2017-10-25 PROCEDURE — 99233 SBSQ HOSP IP/OBS HIGH 50: CPT | Mod: 25 | Performed by: INTERNAL MEDICINE

## 2017-10-25 PROCEDURE — 80048 BASIC METABOLIC PNL TOTAL CA: CPT

## 2017-10-25 PROCEDURE — A9585 GADOBUTROL INJECTION: HCPCS | Performed by: INTERNAL MEDICINE

## 2017-10-25 PROCEDURE — 25000128 H RX IP 250 OP 636: Performed by: PHYSICIAN ASSISTANT

## 2017-10-25 PROCEDURE — 90686 IIV4 VACC NO PRSV 0.5 ML IM: CPT | Performed by: INTERNAL MEDICINE

## 2017-10-25 PROCEDURE — 85027 COMPLETE CBC AUTOMATED: CPT

## 2017-10-25 PROCEDURE — 80061 LIPID PANEL: CPT

## 2017-10-25 PROCEDURE — 25000128 H RX IP 250 OP 636: Performed by: INTERNAL MEDICINE

## 2017-10-25 PROCEDURE — 36415 COLL VENOUS BLD VENIPUNCTURE: CPT

## 2017-10-25 PROCEDURE — 25000132 ZZH RX MED GY IP 250 OP 250 PS 637: Performed by: INTERNAL MEDICINE

## 2017-10-25 PROCEDURE — 25000132 ZZH RX MED GY IP 250 OP 250 PS 637

## 2017-10-25 PROCEDURE — 75561 CARDIAC MRI FOR MORPH W/DYE: CPT | Mod: 26 | Performed by: INTERNAL MEDICINE

## 2017-10-25 PROCEDURE — 21000000 ZZH R&B IMCU HEART CARE

## 2017-10-25 PROCEDURE — 25000132 ZZH RX MED GY IP 250 OP 250 PS 637: Performed by: NURSE PRACTITIONER

## 2017-10-25 PROCEDURE — 75561 CARDIAC MRI FOR MORPH W/DYE: CPT

## 2017-10-25 PROCEDURE — 84484 ASSAY OF TROPONIN QUANT: CPT

## 2017-10-25 RX ORDER — CLOPIDOGREL 300 MG/1
300 TABLET, FILM COATED ORAL ONCE
Status: COMPLETED | OUTPATIENT
Start: 2017-10-25 | End: 2017-10-25

## 2017-10-25 RX ORDER — ATORVASTATIN CALCIUM 80 MG/1
80 TABLET, FILM COATED ORAL DAILY
Status: DISCONTINUED | OUTPATIENT
Start: 2017-10-25 | End: 2017-10-26 | Stop reason: HOSPADM

## 2017-10-25 RX ORDER — CLOPIDOGREL BISULFATE 75 MG/1
75 TABLET ORAL DAILY
Status: DISCONTINUED | OUTPATIENT
Start: 2017-10-26 | End: 2017-10-26 | Stop reason: HOSPADM

## 2017-10-25 RX ORDER — GADOBUTROL 604.72 MG/ML
5-65 INJECTION INTRAVENOUS ONCE
Status: COMPLETED | OUTPATIENT
Start: 2017-10-25 | End: 2017-10-25

## 2017-10-25 RX ADMIN — ASPIRIN 81 MG: 81 TABLET, COATED ORAL at 09:26

## 2017-10-25 RX ADMIN — TICAGRELOR 90 MG: 90 TABLET ORAL at 11:21

## 2017-10-25 RX ADMIN — LISINOPRIL 10 MG: 10 TABLET ORAL at 09:26

## 2017-10-25 RX ADMIN — LISINOPRIL 10 MG: 10 TABLET ORAL at 20:27

## 2017-10-25 RX ADMIN — INFLUENZA A VIRUS A/MICHIGAN/45/2015 X-275 (H1N1) ANTIGEN (FORMALDEHYDE INACTIVATED), INFLUENZA A VIRUS A/HONG KONG/4801/2014 X-263B (H3N2) ANTIGEN (FORMALDEHYDE INACTIVATED), INFLUENZA B VIRUS B/PHUKET/3073/2013 ANTIGEN (FORMALDEHYDE INACTIVATED), AND INFLUENZA B VIRUS B/BRISBANE/60/2008 ANTIGEN (FORMALDEHYDE INACTIVATED) 0.5 ML: 15; 15; 15; 15 INJECTION, SUSPENSION INTRAMUSCULAR at 11:22

## 2017-10-25 RX ADMIN — CARVEDILOL 6.25 MG: 6.25 TABLET, FILM COATED ORAL at 09:26

## 2017-10-25 RX ADMIN — Medication 10 MEQ: at 01:11

## 2017-10-25 RX ADMIN — GADOBUTROL 13 ML: 604.72 INJECTION INTRAVENOUS at 13:16

## 2017-10-25 RX ADMIN — RANITIDINE 150 MG: 150 TABLET ORAL at 09:26

## 2017-10-25 RX ADMIN — CARVEDILOL 6.25 MG: 6.25 TABLET, FILM COATED ORAL at 20:27

## 2017-10-25 RX ADMIN — RANITIDINE 150 MG: 150 TABLET ORAL at 20:27

## 2017-10-25 RX ADMIN — Medication 10 MEQ: at 02:25

## 2017-10-25 RX ADMIN — ATORVASTATIN CALCIUM 80 MG: 80 TABLET, FILM COATED ORAL at 20:26

## 2017-10-25 RX ADMIN — CLOPIDOGREL BISULFATE 300 MG: 300 TABLET, FILM COATED ORAL at 20:27

## 2017-10-25 NOTE — PROGRESS NOTES
[facility]    Cardiology Progress Note    Date of Service (when I saw the patient): 10/25/2017     Assessment & Plan   Sachin Xavier is a 47 year old male who was admitted on 10/24/2017 with an acute inferior STEMI.  He is a past medical history is hypertension and hyperlipidemia.  He does not have personal history of coronary artery disease, but has a family history of premature coronary disease with his father having an MI in his 50s.     1) Acute inferior STEMI:  Acute thrombotic lesion of his RCA and was treated with PCI with drug-eluting stent.  LVEF 50-55% with inferior hypokinesis on LV gram. Possible clot versus trabeculation on LV gram.  Troponin peaked at 18.   -Dual antiplatelet therapy with aspirin and Brillinta  -Commenced on carvedilol and lisinopril.  -Cardiac MRI was ordered for evaluation of infarct and for LV clot.    2) Hypertension: [PTA lisinopril hydrochlorothiazide 10-12.5 grams daily]  -Commenced on carvedilol 6.25 mg twice daily and lisinopril 10 mg twice daily    3) Dyslipidemia: [PTA atorvastatin 40 mg daily]  Fasting lipids showed total cholesterol 179, triglycerides 210, HDL 39, and LDL 98  -Increase atorvastatin to 80 mg daily  -Repeat fasting lipid profile in 4-6 weeks as an outpatient    LISA DELGADO, CNP    Interval History     [facility] follow up:  Esperanza Higginbotham PA-C 10:10 am on 11/9 in [place]    Disposition: Likely discharge tomorrow with outpatient follow-up in 7-10 days and repeat lipids in 4-6 weeks    Physical Exam   Temp: 98.4  F (36.9  C) Temp src: Oral BP: 120/83   Heart Rate: 57 Resp: 14 SpO2: 96 % O2 Device: None (Room air) Oxygen Delivery: 4 LPM  Vitals:    10/24/17 1256 10/25/17 0600   Weight: 97.2 kg (214 lb 4.6 oz) 94.4 kg (208 lb 1.8 oz)     Vital Signs with Ranges  Temp:  [98  F (36.7  C)-98.9  F (37.2  C)] 98.4  F (36.9  C)  Heart Rate:  [50-86] 57  Resp:  [7-22] 14  BP: (107-162)/() 120/83  SpO2:  [94 %-99 %] 96 %  I/O last 3  completed shifts:  In: 980 [P.O.:980]  Out: 400 [Urine:400]    Constitutional:   NAD   Skin:   Warm and dry   Head:   Nontraumatic   Lungs:   symmetric, clear to auscultation   Cardiovascular:   regular rate and rhythm, normal S1 and S2 and no murmur noted   Abdomen:   Benign   Extremities and Back:   Right radial access site CDI without bruit with mild ecchymosis at the side and No edema   Neurological:   Grossly nonfocal       Medications     Percutaneous Coronary Intervention orders placed (this is information for BPA alerting)         sodium chloride (PF)  3 mL Intracatheter Q8H     aspirin EC  81 mg Oral Daily     ticagrelor  90 mg Oral Q12H     carvedilol  6.25 mg Oral BID     atorvastatin  40 mg Oral Daily     ranitidine  150 mg Oral Q12H     influenza quadrivalent (PF) vacc age 3 yrs and older  0.5 mL Intramuscular Prior to discharge     lisinopril  10 mg Oral BID 09 12       Data     TELE: SR

## 2017-10-25 NOTE — CONSULTS
"NUTRITION EDUCATION    REASON FOR ASSESSMENT:  Nutrition education on American Heart Association (AHA) Heart Healthy Diet.    NUTRITION HISTORY:  Visited with pt this morning  He is a  and cooks all of his family's meals  Eats fruits and veggies daily  Consumes fish, poultry and beef  Notes that his wife is a vegetarian - \"we make a lot of bean/legume dishes\"  Uses olive and canola oil  Likes to have Greek yogurt in the morning  Admits that his \"problem\" is bread and pasta - buys whole wheat, but says portions are too big  Is not a sweets eater - \"prefer to have fruit\"    CURRENT DIET ORDER:  2400mg Na, LSF    NUTRITION DIAGNOSIS:  Food- and nutrition-related knowledge deficit R/t no prior diet education AEB this is new dx for pt     INTERVENTIONS:  Nutrition Prescription:    Recommended AHA Heart Healthy Diet    Implementation:     Nutrition Education (Content):  a) reviewed the Nutrition chapter in the  Understanding Artery Disease  binder  b) reviewed AHA Heart Healthy Diet guidelines    Nutrition Education (Application):  a) Discussed current eating habits and recommended food choices    Anticipate good compliance    Diet Education - refer to Education flowsheet    Goals:    Patient verbalizes understanding of diet     All of the above goals met during education session    Follow Up/Monitoring:    Provided RD contact information for future questions        "

## 2017-10-25 NOTE — PLAN OF CARE
Problem: Cardiac: ACS (Acute Coronary Syndrome) (Adult)  Goal: Signs and Symptoms of Listed Potential Problems Will be Absent, Minimized or Managed (Cardiac: ACS)  Signs and symptoms of listed potential problems will be absent, minimized or managed by discharge/transition of care (reference Cardiac: ACS (Acute Coronary Syndrome) (Adult) CPG).  Outcome: Improving  VSS. Pt admitted through ED today with c/o CP and noted to have inferior STEMI and sent emergently to cath lab. 2 stents were placed to RCA w/ complete relief of CP. R TR band is off w/ arm board in place. CMS intact. AAOx4. No c/o pain. SR/SB 50-60's. Up w/ SBA. NPO after MN for Cardiac MRI tomorrow. CTM.

## 2017-10-25 NOTE — PLAN OF CARE
Problem: Patient Care Overview  Goal: Plan of Care/Patient Progress Review  OT/CR: Orders received and chart reviewed. Discussed with RN, pt will be going for cardiac MRI to monitor potential clot. Will hold OT/CR at this time.

## 2017-10-25 NOTE — PROGRESS NOTES
Called the discharge pharmacy to check on Brilitna coverage. With his [employer] insurance, his co-pay would be $330 because he has not met his deductible.They could give a co pay assistance card which would give $100 off a month.  Eliquist is the same price.    Updated MD and NP.

## 2017-10-25 NOTE — PROVIDER NOTIFICATION
MD Notification    Notified Person:  MD    Notified Persons Name: Anthony    Notification Date/Time: 2147    Notification Interaction:  Talked with Physician    Purpose of Notification: 's/100's    Orders Received: One time dose Lisinopril 10mg, change scheduled Lisinopril to 10mg BID hold for SBP <100. If BP remains >150 give one time dose Amlodipine 2.5mg.     Comments:

## 2017-10-25 NOTE — PLAN OF CARE
Problem: Patient Care Overview  Goal: Plan of Care/Patient Progress Review  Outcome: No Change  A&O, denies CP/SOB. Rt radial c/d/i, CMS intact. Pt c/o nausea, indigestion, and frequently belching. S/Sx improved after large BM. Potassium replaced per protocol, recheck w/AM labs. BP's 150-160's/100's, 1x dose 10mg Lisinopril given with improvement. Daily lisinopril increased to 10mg BID. HR 50-60's 's-80-90's O2 sats upper 90's on RA. NPO for cardiac MRI to r/o LV clot. Continue to monitor.

## 2017-10-25 NOTE — PLAN OF CARE
Problem: Cardiac: ACS (Acute Coronary Syndrome) (Adult)  Goal: Signs and Symptoms of Listed Potential Problems Will be Absent, Minimized or Managed (Cardiac: ACS)  Signs and symptoms of listed potential problems will be absent, minimized or managed by discharge/transition of care (reference Cardiac: ACS (Acute Coronary Syndrome) (Adult) CPG).   Outcome: No Change  VSS. No c/o pain. SR 70-80's. Up independently. Cardiac MRI completed to r/o LV clot. R radial site w/ arm board--CDI. Pending results of MRI. Plan is cardiac rehab, monitor for one more night and possible d/c tomorrow.

## 2017-10-26 ENCOUNTER — APPOINTMENT (OUTPATIENT)
Dept: OCCUPATIONAL THERAPY | Facility: CLINIC | Age: 47
DRG: 247 | End: 2017-10-26
Payer: COMMERCIAL

## 2017-10-26 VITALS
TEMPERATURE: 98.1 F | RESPIRATION RATE: 16 BRPM | DIASTOLIC BLOOD PRESSURE: 94 MMHG | HEIGHT: 68 IN | BODY MASS INDEX: 31.54 KG/M2 | WEIGHT: 208.11 LBS | SYSTOLIC BLOOD PRESSURE: 121 MMHG | OXYGEN SATURATION: 98 %

## 2017-10-26 LAB — POTASSIUM SERPL-SCNC: 4.4 MMOL/L (ref 3.4–5.3)

## 2017-10-26 PROCEDURE — 25000132 ZZH RX MED GY IP 250 OP 250 PS 637: Performed by: NURSE PRACTITIONER

## 2017-10-26 PROCEDURE — 25000132 ZZH RX MED GY IP 250 OP 250 PS 637

## 2017-10-26 PROCEDURE — 25000132 ZZH RX MED GY IP 250 OP 250 PS 637: Performed by: INTERNAL MEDICINE

## 2017-10-26 PROCEDURE — 99232 SBSQ HOSP IP/OBS MODERATE 35: CPT | Performed by: INTERNAL MEDICINE

## 2017-10-26 PROCEDURE — 97535 SELF CARE MNGMENT TRAINING: CPT | Mod: GO

## 2017-10-26 PROCEDURE — 97165 OT EVAL LOW COMPLEX 30 MIN: CPT | Mod: GO

## 2017-10-26 PROCEDURE — 84132 ASSAY OF SERUM POTASSIUM: CPT | Performed by: INTERNAL MEDICINE

## 2017-10-26 PROCEDURE — 97110 THERAPEUTIC EXERCISES: CPT | Mod: GO

## 2017-10-26 PROCEDURE — 40000133 ZZH STATISTIC OT WARD VISIT

## 2017-10-26 PROCEDURE — 36415 COLL VENOUS BLD VENIPUNCTURE: CPT | Performed by: INTERNAL MEDICINE

## 2017-10-26 RX ORDER — ATORVASTATIN CALCIUM 80 MG/1
80 TABLET, FILM COATED ORAL DAILY
Qty: 90 TABLET | Refills: 3 | Status: SHIPPED | OUTPATIENT
Start: 2017-10-26 | End: 2017-12-18

## 2017-10-26 RX ORDER — LISINOPRIL 10 MG/1
10 TABLET ORAL DAILY
Qty: 90 TABLET | Refills: 3 | Status: SHIPPED | OUTPATIENT
Start: 2017-10-26 | End: 2017-10-26

## 2017-10-26 RX ORDER — NITROGLYCERIN 0.4 MG/1
TABLET SUBLINGUAL
Qty: 25 TABLET | Refills: 3 | Status: SHIPPED | OUTPATIENT
Start: 2017-10-26 | End: 2017-12-18

## 2017-10-26 RX ORDER — CARVEDILOL 6.25 MG/1
6.25 TABLET ORAL 2 TIMES DAILY
Qty: 180 TABLET | Refills: 3 | Status: SHIPPED | OUTPATIENT
Start: 2017-10-26 | End: 2017-12-18

## 2017-10-26 RX ORDER — LISINOPRIL 10 MG/1
10 TABLET ORAL 2 TIMES DAILY
Qty: 180 TABLET | Refills: 3 | Status: SHIPPED | OUTPATIENT
Start: 2017-10-26 | End: 2017-12-18

## 2017-10-26 RX ORDER — CLOPIDOGREL BISULFATE 75 MG/1
75 TABLET ORAL DAILY
Qty: 90 TABLET | Refills: 3 | Status: SHIPPED | OUTPATIENT
Start: 2017-10-26 | End: 2017-10-31 | Stop reason: ALTCHOICE

## 2017-10-26 RX ADMIN — LISINOPRIL 10 MG: 10 TABLET ORAL at 08:55

## 2017-10-26 RX ADMIN — CARVEDILOL 6.25 MG: 6.25 TABLET, FILM COATED ORAL at 08:55

## 2017-10-26 RX ADMIN — RANITIDINE 150 MG: 150 TABLET ORAL at 08:55

## 2017-10-26 RX ADMIN — CLOPIDOGREL 75 MG: 75 TABLET, FILM COATED ORAL at 08:55

## 2017-10-26 RX ADMIN — ASPIRIN 81 MG: 81 TABLET, COATED ORAL at 08:55

## 2017-10-26 ASSESSMENT — ACTIVITIES OF DAILY LIVING (ADL): PREVIOUS_RESPONSIBILITIES: MEAL PREP;HOUSEKEEPING;YARDWORK;MEDICATION MANAGEMENT;FINANCES;DRIVING;WORK

## 2017-10-26 NOTE — PLAN OF CARE
Problem: Patient Care Overview  Goal: Plan of Care/Patient Progress Review  Outcome: Improving  A&O, up independently in room. Denies CP/SOB. HR 50-60's, BP improved today 115-130/80's. O2 sats upper 90's on RA. Rt radial site ecchymotic, CMS intact. Slept between cares. Tele: SB. Plan for cardiac rehab and probable d/c today.

## 2017-10-26 NOTE — PLAN OF CARE
"Problem: Patient Care Overview  Goal: Plan of Care/Patient Progress Review  OT/cardiac rehab eval and treatment completed. See OT eval for full report of baseline function and living situation.   Discharge Planner OT   Patient plan for discharge: home w/family and OPCR 2 at [facility] location  Current status: indep mob, self-cares. Tolerated 15min on treadmill @ 2.0mph, 25 stairs denying symptoms, rated fatigue at \"0\", maintained stable cardiac response throughout.  Pre and post:  /94 - 127/87, HR 86 and 85-89 during treadmill activity.    Barriers to return to prior living situation: none noted  Recommendations for discharge: home w/OP CR  Rationale for recommendations: per protocol       Entered by: Pawan Hogue 10/26/2017 3:48 PM         "

## 2017-10-26 NOTE — DISCHARGE INSTRUCTIONS
Cardiac Angiogram Discharge Instructions - Radial    After you go home:      Have an adult stay with you until tomorrow.    Drink extra fluids for 2 days.    You may resume your normal diet.    No smoking       For 24 hours - due to the sedation you received:    Relax and take it easy.    Do NOT make any important or legal decisions.    Do NOT drive or operate machines at home or at work.    Do NOT drink alcohol.    Care of Wrist Puncture Site:      For the first 24 hrs - check the puncture site every 1-2 hours while awake.    It is normal to have soreness at the puncture site and mild tingling in your hand for up to 3 days.    Remove the bandaid after 24 hours. If there is minor oozing, apply another bandaid and remove it after 12 hours.    You may shower tomorrow.  Do NOT take a bath, or use a hot tub or pool for at least 3 days. Do NOT scrub the site. Do not use lotion or powder near the puncture site.           Activity:        For 2 days:     do not use your hand or arm to support your weight (such as rising from a chair)     do not bend your wrist (such as lifting a garage door).    do not lift more than 5 pounds or exercise your arm (such as tennis, golf or bowling).    Do NOT do any heavy activity such as exercise, lifting, or straining.     Bleeding:      If you start bleeding from the site in your wrist, sit down and press firmly on/above the site for 10 minutes.     Once bleeding stops, keep arm still for 2 hours.     Call [facility] as soon as you can.       Call 911 right away if you have heavy bleeding or bleeding that does not stop.      Medicines:      If you are taking antiplatelet medications such as Plavix, Brilinta or Effient, do not stop taking it until you talk to your cardiologist.        If you are on Metformin (Glucophage), do not restart it until you have blood tests (within 2 to 3 days after discharge).  After you have your blood drawn, you may restart the Metformin.     Take your  medications, including blood thinners, unless your provider tells you not to.  If you take Coumadin (Warfarin), have your INR checked by your provider in  3-5 days. Call your clinic to schedule this.    If you have stopped any medicines, check with your provider about when to restart them.    Follow Up Appointments:      Follow up with [facility] Nurse Practitioner at [facility] of patient preference in 7-10 days.    Call the clinic if:      You have a large or growing hard lump around the site.    The site is red, swollen, hot or tender.    Blood or fluid is draining from the site.    You have chills or a fever greater than 101 F (38 C).    Your arm turns feels numb, cool or changes color.    You have hives, a rash or unusual itching.    Any questions or concerns.          [facility] [place]:    [phone] [facility] (7 days a week)

## 2017-10-26 NOTE — PROGRESS NOTES
10/26/17 1510   Quick Adds   Type of Visit Initial Occupational Therapy Evaluation   Living Environment   Lives With spouse;child(ankur), dependent   Living Arrangements house   Home Accessibility stairs to enter home;stairs within home   Number of Stairs to Enter Home 3   Number of Stairs Within Home 13   Transportation Available car;Medicaid transportation   Self-Care   Dominant Hand right   Usual Activity Tolerance good   Current Activity Tolerance moderate   Regular Exercise yes   Activity/Exercise Type team sports  (soccer)   Exercise Amount/Frequency 2 times/wk   Equipment Currently Used at Home none   Functional Level Prior   Ambulation 0-->independent   Transferring 0-->independent   Toileting 0-->independent   Bathing 0-->independent   Dressing 0-->independent   Eating 0-->independent   Communication 0-->understands/communicates without difficulty   Swallowing 0-->swallows foods/liquids without difficulty   Cognition 0 - no cognition issues reported   Fall history within last six months no   Which of the above functional risks had a recent onset or change? 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 Nino Valentin    Patient/Family Goals Statement return home   Additional Occupational Profile Info/Pertinent History of Current Problem admited with chest pain; diagnosed STEMI, underwent angio with PCI /CHELLE to RCA.  PMH includes HTN, HLP.   Precautions/Limitations no known precautions/limitations   Cognitive Status Examination   Orientation orientation to person, place and time   Level of Consciousness alert   Able to Follow Commands WNL/WFL   Personal Safety (Cognitive) WNL/WFL   Visual Perception   Visual Perception No deficits were identified;Wears glasses   Pain Assessment   Patient Currently in Pain No   Range of Motion (ROM)   ROM Quick Adds No deficits were identified  "  Strength   Manual Muscle Testing Quick Adds No deficits were identified   Mobility   Bed Mobility Comments Indep   Transfer Skills   Transfer Comments Indep   Balance   Balance Comments indep   Instrumental Activities of Daily Living (IADL)   Previous Responsibilities meal prep;housekeeping;yardwork;medication management;finances;driving;work   Activities of Daily Living Analysis   Impairments Contributing to Impaired Activities of Daily Living strength decreased  (endurance)   General Therapy Interventions   Planned Therapy Interventions ADL retraining;home program guidelines;progressive activity/exercise;risk factor education   Clinical Impression   Criteria for Skilled Therapeutic Interventions Met yes, treatment indicated   OT Diagnosis decreased ADL/IADL performance   Influenced by the following impairments endurance   Assessment of Occupational Performance 1-3 Performance Deficits   Identified Performance Deficits work/social/recreational skills   Clinical Decision Making (Complexity) Low complexity   Predicted Duration of Therapy Intervention (days/wks) eval and one treatment   Anticipated Discharge Disposition Home with Outpatient Therapy   Risks and Benefits of Treatment have been explained. Yes   Patient, Family & other staff in agreement with plan of care Yes   [facility] TM \"6 Clicks\"   2016, Trustees of [facility], under license to [employer].  All rights reserved.   6 Clicks Short Forms Daily Activity Inpatient Short Form   [facility]  \"6 Clicks\" Daily Activity Inpatient Short Form   1. Putting on and taking off regular lower body clothing? 4 - None   2. Bathing (including washing, rinsing, drying)? 4 - None   3. Toileting, which includes using toilet, bedpan or urinal? 4 - None   4. Putting on and taking off regular upper body clothing? 4 - None   5. Taking care of personal grooming such as brushing teeth? 4 - None   6. Eating meals? 4 - None   Daily Activity Raw " Score (Score out of 24.Lower scores equate to lower levels of function) 24   Total Evaluation Time   Total Evaluation Time (Minutes) 15

## 2017-10-26 NOTE — PROGRESS NOTES
[facility]    Cardiology Progress Note    Date of Service (when I saw the patient): 10/26/2017     Assessment & Plan   Sachin Xavier is a 47 year old male who was admitted on 10/24/2017 with chest pain radiating to left arm-->acute inferior STEMI.  He is a past medical history is hypertension and hyperlipidemia.  He does not have personal history of coronary artery disease, but has a family history of premature coronary disease with his father having an MI in his 50s.     1) Acute inferior STEMI:    -Acute thrombotic lesion of his RCA and was treated with PCI with drug-eluting stent (3.5 x 38mm  -LVEF 50-55% with inferior hypokinesis on LV gram. Possible clot versus trabeculation on LV gram.-not present on CMR  Troponin peaked at 18.   -Dual antiplatelet therapy--> aspirin and Brillinta  -Commenced on carvedilol and lisinopril-titrate further as OP  -EF 59% on CMR    2) Hypertension: [PTA lisinopril hydrochlorothiazide 10-12.5 grams daily]  -Commenced on carvedilol 6.25 mg twice daily and lisinopril 10 mg twice daily  --130  -creat normal--check at OP visit    3) Dyslipidemia: [PTA atorvastatin 40 mg daily]  Fasting lipids showed total cholesterol 179, triglycerides 210, HDL 39, and LDL 98  -Increase atorvastatin to 80 mg daily  -Repeat fasting lipid profile in 4-6 weeks as an outpatient and see Dr. Wakefield  Nonsmoker  Works as -off 2-3 weeks    Home today with OP follow up and Cardiac rehab  Will need rx send to local pharmacy when he is seen for follow up  Haritha Romero-Rayray, LISA, CNP    Interval History     [facility] follow up:  Esperanza Higginbotham PA-C 10:10 am on 11/9 in [place]    Disposition: Discharge today with outpatient follow-up in 7-10 days and repeat lipids in 4-6 weeks.    Physical Exam   Temp: 98.3  F (36.8  C) Temp src: Oral BP: 119/83   Heart Rate: 66 Resp: 16 SpO2: 98 % O2 Device: None (Room air)    Vitals:    10/24/17 1256 10/25/17 0600   Weight: 97.2 kg (214 lb 4.6  oz) 94.4 kg (208 lb 1.8 oz)     Vital Signs with Ranges  Temp:  [97.9  F (36.6  C)-98.4  F (36.9  C)] 98.3  F (36.8  C)  Heart Rate:  [52-91] 66  Resp:  [13-39] 16  BP: (115-147)/(83-96) 119/83  SpO2:  [95 %-98 %] 98 %  I/O last 3 completed shifts:  In: 360 [P.O.:360]  Out: -     Constitutional:   NAD   Skin:   Warm and dry   Head:   Nontraumatic   Lungs:   symmetric, clear to auscultation   Cardiovascular:   regular rate and rhythm, normal S1 and S2-S4- no murmur noted   Abdomen:   Benign   Extremities and Back:   Right radial access site CDI without bruit with mild ecchymosis at the side and No edema   Neurological:   Grossly nonfocal       Medications     Percutaneous Coronary Intervention orders placed (this is information for BPA alerting)         atorvastatin  80 mg Oral Daily     clopidogrel  75 mg Oral Daily     sodium chloride (PF)  3 mL Intracatheter Q8H     aspirin EC  81 mg Oral Daily     carvedilol  6.25 mg Oral BID     ranitidine  150 mg Oral Q12H     lisinopril  10 mg Oral BID 09 12       Data     TELE: SR

## 2017-10-26 NOTE — PLAN OF CARE
Problem: Cardiac: ACS (Acute Coronary Syndrome) (Adult)  Goal: Signs and Symptoms of Listed Potential Problems Will be Absent, Minimized or Managed (Cardiac: ACS)  Signs and symptoms of listed potential problems will be absent, minimized or managed by discharge/transition of care (reference Cardiac: ACS (Acute Coronary Syndrome) (Adult) CPG).   Outcome: Adequate for Discharge Date Met:  10/26/17  VSS. No c/o pain. Discharge teaching was completed with emphasis on post-angio site care and education on new cardiac medications. New prescriptions were filled and given to patient. Handouts on medications were given and placed in CAD binder. Pt educated on when to call 911. Stent card was given to pt. All belongings accounted for. No further questions at this time. To exit via wheelchair w/ daughter.

## 2017-10-26 NOTE — PLAN OF CARE
Problem: Patient Care Overview  Goal: Plan of Care/Patient Progress Review  2 CHELLE to RCA 10/24. BP's 150-160;s/100's, Lisinopril increased to 10mg BID. Potassium replaced for K of 3.0 Denies CP/SOB. NPO for Cardiac MRI 10/25 to r/o LV clot.   No changes, up independently and doing well.

## 2017-10-27 ENCOUNTER — CARE COORDINATION (OUTPATIENT)
Dept: CARDIOLOGY | Facility: CLINIC | Age: 47
End: 2017-10-27

## 2017-10-27 NOTE — PROGRESS NOTES
Called patient who was d/c from hospital following a STEMI  with a lesion of his RCA and was treated with PCI with drug-eluting stent (3.5 x 38mm)  to discuss any post hospital d/c questions he may have, review medication changes, and confirm f/u appts. Patient denied any questions regarding new medications or changes to some of his current medications that he was taking prior to admission. Patient denied any SOB, chest pain, or light headedness. RN confirmed with patient that he has an apt scheduled on 11/9/17 with JOHN Esperanza Higginbotham (9:10am lab and 10:10am with JOHN). RN also advised patient that we would like him to schedule an apt for OP cardiac rehab. RN provided phone number for patient to schedule cardiac rehab. Patient advised to call clinic with any cardiac related questions or concerns prior to his apt on 11/9/17. Patient verbalized understanding and agreed with plan.

## 2017-10-30 ENCOUNTER — TRANSFERRED RECORDS (OUTPATIENT)
Dept: HEALTH INFORMATION MANAGEMENT | Facility: CLINIC | Age: 47
End: 2017-10-30

## 2017-10-30 ENCOUNTER — CARE COORDINATION (OUTPATIENT)
Dept: CARDIOLOGY | Facility: CLINIC | Age: 47
End: 2017-10-30

## 2017-10-30 DIAGNOSIS — I25.10 CAD (CORONARY ARTERY DISEASE): Primary | ICD-10-CM

## 2017-10-30 NOTE — PROGRESS NOTES
patient had stents placed last tuesday - woke up with discomort in his back that radiated to his arm. Call to discuss concerns.. Patient noting that this discomfort in his shoulders and arm is completely different than the discomfort he experienced with his NSTEMI last week, patient also reporting that his discomfort is constant and does not change with activities.  The patient was sitting in the waiting room at [facility] urgent care when I was on the phone with his wife.. The patient is going to get evaluated at this discomfort is rating at a 7/10.. Records from recent hospitalization faxed to [facility] as [place] is out of the patients network. No other tasks to be done at this time.  Venus Viveros

## 2017-10-31 NOTE — PROGRESS NOTES
Patient was seen yesterday in the urgent care at [facility]. His neck and arm pain was deemed to be muscular-skeletal and was advised to take Tylenol and muscle relaxers.. The patient reporting that the muscle aches continue and he is wondering if its his new medication Plavix.. - patient seeking if there are any other alternative.  Reviewed the recent concerns with Dr. Menchaca - who reports that he has never heard of this but agreed we could switch the patient to brilinta. The patient to take Brilinta 90 mg BID in place of his plavix..  Advised the patient to seek another evaluation if the symptoms do not improve.. The patient atorvastatin did get increased at recent hospitalization from 40 mg to 80 mg - patient is scheduled to see his PMD next week.. The patient and his wife understood the instructions and agreed to seek care. Medication list updated. No other tasks to be done at this time. Venus Viveros

## 2017-11-01 ENCOUNTER — HOSPITAL ENCOUNTER (OUTPATIENT)
Dept: CARDIAC REHAB | Facility: CLINIC | Age: 47
End: 2017-11-01
Attending: NURSE PRACTITIONER
Payer: COMMERCIAL

## 2017-11-01 ENCOUNTER — TELEPHONE (OUTPATIENT)
Dept: CARDIOLOGY | Facility: CLINIC | Age: 47
End: 2017-11-01

## 2017-11-01 PROCEDURE — 93797 PHYS/QHP OP CAR RHAB WO ECG: CPT

## 2017-11-01 PROCEDURE — 93798 PHYS/QHP OP CAR RHAB W/ECG: CPT

## 2017-11-01 PROCEDURE — 40000116 ZZH STATISTIC OP CR VISIT

## 2017-11-01 PROCEDURE — 40000575 ZZH STATISTIC OP CARDIAC VISIT #2

## 2017-11-01 RX ORDER — CLOPIDOGREL BISULFATE 75 MG/1
75 TABLET ORAL DAILY
Qty: 90 TABLET | Refills: 3 | COMMUNITY
Start: 2017-11-01 | End: 2017-12-18

## 2017-11-01 NOTE — TELEPHONE ENCOUNTER
Update appreciated.   I look forward to meeting him.  Esperanza Higginbotham PA-C 11/1/2017 12:44 PM

## 2017-11-01 NOTE — TELEPHONE ENCOUNTER
"RN received phone call from cardiac rehab informing this RN that some T wave inversions were noted today on patient's EKG in lead 1 (see in EPIC). Cardiac rehab reported that patient did not complain of symptoms of chest pain during exercise today, but of mild neck/shoulder pain. Of note, patient was evaluated at [facility] on 10/30/17 (note in care everywhere) for similar symptoms of neck and shoulder pain. Patient was d/c from urgent care with muscle relaxer and tylenol. 12 lead EKG was completed at [facility] on 10/30/17 (RN called and requested tracings). RN called patient to discuss symptoms and patient denied any symptoms of chest pain at this time or SOB either at rest or with exertion. Patient reported to this RN that he experiences relief with muscle relaxer and tylenol. Patient again denied any chest pain or pressure since he was discharged from the hospital on 10/26/17. RN reviewed EKG's with Dr. Farnsworth and he advised if the patient was experiencing symptoms to have him come in to be evaluated with EKG. RN again confirmed with patient that he was NOT experiencing any CP or SOB and patient confirmed that this is correct.     RN also reviewed patient's medication list with him as it was reported from cardiac rehab that patient was not currently taking Brilinta (on current med list), but was taking plavix. RN clarified with patient that he stated when he was d/c from hospital he was taking 75mg Plavix BID (rx once daily) and did not realize this until a few days ago. Patient called clinic once he realized this and requested to have this medication changed as he believed he was experiencing symptoms of neck and shoulder pain as a result of Plavix. Dr. Menchaca reviewed this and advised that patient could switch from plavix to brilinita (see telephone encounter 10/31/17). Patient reported that when he went to  rx for Brilinta he did not have it filled as it was \"too expensive.\" Patient " reports to RN that he resumed Plavix at 75mg daily (correct dose) and that is what he is currently taking. RN updated patient's med list. RN confirmed with patient that at no point did he not take Plavix, but only doubled the dose for a few days. Patient stated that that is correct. Patient has always taken at least on 75mg tablet of plavix since d/c from hospital.    RN advised patient that should he develop any CP, SOB at rest or with exertion, he should call the clinic.    Patient acknowledged understanding and agreed with plan. RN confirmed with patient he is scheduled to f/u with JOHN Esperanza More on 11/9/17.

## 2017-11-02 ENCOUNTER — HOSPITAL ENCOUNTER (OUTPATIENT)
Dept: CARDIAC REHAB | Facility: CLINIC | Age: 47
End: 2017-11-02
Attending: NURSE PRACTITIONER
Payer: COMMERCIAL

## 2017-11-02 PROCEDURE — 40000116 ZZH STATISTIC OP CR VISIT

## 2017-11-02 PROCEDURE — 93798 PHYS/QHP OP CAR RHAB W/ECG: CPT

## 2017-11-03 ENCOUNTER — HOSPITAL ENCOUNTER (OUTPATIENT)
Dept: CARDIAC REHAB | Facility: CLINIC | Age: 47
End: 2017-11-03
Attending: NURSE PRACTITIONER
Payer: COMMERCIAL

## 2017-11-03 ENCOUNTER — TELEPHONE (OUTPATIENT)
Dept: CARDIOLOGY | Facility: CLINIC | Age: 47
End: 2017-11-03

## 2017-11-03 PROCEDURE — 93798 PHYS/QHP OP CAR RHAB W/ECG: CPT | Performed by: REHABILITATION PRACTITIONER

## 2017-11-03 PROCEDURE — 40000116 ZZH STATISTIC OP CR VISIT: Performed by: REHABILITATION PRACTITIONER

## 2017-11-03 NOTE — TELEPHONE ENCOUNTER
Received FMLA forms for pt. Pt has OV with Esperanza Higginbotham on 11/9. Forms given to JESICA Rojas with Esperanza Higginbotham and she will place with prep for 11/9 OV. Anna Marie MOONEY

## 2017-11-06 ENCOUNTER — HOSPITAL ENCOUNTER (OUTPATIENT)
Dept: CARDIAC REHAB | Facility: CLINIC | Age: 47
End: 2017-11-06
Attending: NURSE PRACTITIONER
Payer: COMMERCIAL

## 2017-11-06 PROCEDURE — 40000575 ZZH STATISTIC OP CARDIAC VISIT #2: Performed by: OCCUPATIONAL THERAPIST

## 2017-11-06 PROCEDURE — 93797 PHYS/QHP OP CAR RHAB WO ECG: CPT | Performed by: OCCUPATIONAL THERAPIST

## 2017-11-06 PROCEDURE — 93798 PHYS/QHP OP CAR RHAB W/ECG: CPT

## 2017-11-06 PROCEDURE — 40000116 ZZH STATISTIC OP CR VISIT

## 2017-11-07 ENCOUNTER — HOSPITAL ENCOUNTER (OUTPATIENT)
Dept: CARDIAC REHAB | Facility: CLINIC | Age: 47
End: 2017-11-07
Attending: NURSE PRACTITIONER
Payer: COMMERCIAL

## 2017-11-07 PROCEDURE — 40000116 ZZH STATISTIC OP CR VISIT: Performed by: CLINICAL EXERCISE PHYSIOLOGIST

## 2017-11-07 PROCEDURE — 93798 PHYS/QHP OP CAR RHAB W/ECG: CPT | Performed by: CLINICAL EXERCISE PHYSIOLOGIST

## 2017-11-08 ENCOUNTER — HOSPITAL ENCOUNTER (OUTPATIENT)
Dept: CARDIAC REHAB | Facility: CLINIC | Age: 47
End: 2017-11-08
Attending: NURSE PRACTITIONER
Payer: COMMERCIAL

## 2017-11-08 PROCEDURE — 40000116 ZZH STATISTIC OP CR VISIT: Performed by: OCCUPATIONAL THERAPIST

## 2017-11-08 PROCEDURE — 93798 PHYS/QHP OP CAR RHAB W/ECG: CPT | Performed by: OCCUPATIONAL THERAPIST

## 2017-11-08 PROCEDURE — 93797 PHYS/QHP OP CAR RHAB WO ECG: CPT | Performed by: OCCUPATIONAL THERAPIST

## 2017-11-08 PROCEDURE — 40000575 ZZH STATISTIC OP CARDIAC VISIT #2: Performed by: OCCUPATIONAL THERAPIST

## 2017-11-09 ENCOUNTER — OFFICE VISIT (OUTPATIENT)
Dept: CARDIOLOGY | Facility: CLINIC | Age: 47
End: 2017-11-09
Payer: COMMERCIAL

## 2017-11-09 ENCOUNTER — CARE COORDINATION (OUTPATIENT)
Dept: CARDIOLOGY | Facility: CLINIC | Age: 47
End: 2017-11-09

## 2017-11-09 VITALS
WEIGHT: 213.2 LBS | HEIGHT: 68 IN | BODY MASS INDEX: 32.31 KG/M2 | DIASTOLIC BLOOD PRESSURE: 84 MMHG | SYSTOLIC BLOOD PRESSURE: 128 MMHG | HEART RATE: 63 BPM

## 2017-11-09 DIAGNOSIS — I21.11 STEMI INVOLVING RIGHT CORONARY ARTERY (H): Primary | ICD-10-CM

## 2017-11-09 DIAGNOSIS — I21.11 ST ELEVATION MYOCARDIAL INFARCTION INVOLVING RIGHT CORONARY ARTERY (H): ICD-10-CM

## 2017-11-09 LAB
ANION GAP SERPL CALCULATED.3IONS-SCNC: 12.1 MMOL/L (ref 6–17)
BUN SERPL-MCNC: 16 MG/DL (ref 7–30)
CALCIUM SERPL-MCNC: 9.8 MG/DL (ref 8.5–10.5)
CHLORIDE SERPL-SCNC: 100 MMOL/L (ref 98–107)
CO2 SERPL-SCNC: 29 MMOL/L (ref 23–29)
CREAT SERPL-MCNC: 1.07 MG/DL (ref 0.7–1.3)
GFR SERPL CREATININE-BSD FRML MDRD: 73 ML/MIN/1.7M2
GLUCOSE SERPL-MCNC: 170 MG/DL (ref 70–105)
POTASSIUM SERPL-SCNC: 4.1 MMOL/L (ref 3.5–5.1)
SODIUM SERPL-SCNC: 137 MMOL/L (ref 136–145)

## 2017-11-09 PROCEDURE — 99214 OFFICE O/P EST MOD 30 MIN: CPT | Performed by: PHYSICIAN ASSISTANT

## 2017-11-09 PROCEDURE — 80048 BASIC METABOLIC PNL TOTAL CA: CPT | Performed by: NURSE PRACTITIONER

## 2017-11-09 PROCEDURE — 36415 COLL VENOUS BLD VENIPUNCTURE: CPT | Performed by: NURSE PRACTITIONER

## 2017-11-09 NOTE — PROGRESS NOTES
[place] forms completed and signed by Zoila. Forms mailed to Joaquín Estrada [facility],  department [id number] [address] [place] [id number]. Unable to fax form somewhere, there was only an address listed to send completed forms to. Copy of forms mailed to patient. Copy of forms sent to scan. JESICA Kitchen 3:25 PM 11/09/17

## 2017-11-09 NOTE — PROGRESS NOTES
[id number]  HPI and Plan:   See dictation    Orders this Visit:  Orders Placed This Encounter   Procedures     Basic metabolic panel     Lipid Profile     ALT     Follow-Up with Cardiac Advanced Practice Provider     Follow-Up with Cardiologist     No orders of the defined types were placed in this encounter.    There are no discontinued medications.      Encounter Diagnosis   Name Primary?     STEMI involving right coronary artery (H) Yes       CURRENT MEDICATIONS:  Current Outpatient Prescriptions   Medication Sig Dispense Refill     clopidogrel (PLAVIX) 75 MG tablet Take 1 tablet (75 mg) by mouth daily 90 tablet 3     aspirin EC 81 MG EC tablet Take 1 tablet (81 mg) by mouth daily       nitroGLYcerin (NITROSTAT) 0.4 MG sublingual tablet For chest pain place 1 tablet under the tongue every 5 minutes for 3 doses. If symptoms persist 5 minutes after 1st dose call 911. 25 tablet 3     atorvastatin (LIPITOR) 80 MG tablet Take 1 tablet (80 mg) by mouth daily 90 tablet 3     carvedilol (COREG) 6.25 MG tablet Take 1 tablet (6.25 mg) by mouth 2 times daily 180 tablet 3     ranitidine (ZANTAC) 150 MG tablet Take 1 tablet (150 mg) by mouth every 12 hours 60 tablet 3     lisinopril (PRINIVIL/ZESTRIL) 10 MG tablet Take 1 tablet (10 mg) by mouth 2 times daily 180 tablet 3       ALLERGIES   No Known Allergies    PAST MEDICAL HISTORY:  Past Medical History:   Diagnosis Date     Hyperlipidemia      Hypertension        PAST SURGICAL HISTORY:  Past Surgical History:   Procedure Laterality Date     HERNIA REPAIR       ORTHOPEDIC SURGERY      acl repair       FAMILY HISTORY:  Family History   Problem Relation Age of Onset     Coronary Artery Disease Father      Coronary Artery Disease Paternal Grandmother      Coronary Artery Disease Paternal Grandfather        SOCIAL HISTORY:  Social History     Social History     Marital status:      Spouse name: N/A     Number of children: N/A     Years of education: N/A     Social History Main  "Topics     Smoking status: Never Smoker     Smokeless tobacco: Never Used     Alcohol use Yes      Comment: rare     Drug use: No     Sexual activity: Not Asked     Other Topics Concern     None     Social History Narrative       Review of Systems:  Skin:  Negative     Eyes:  Positive for glasses  ENT:  Negative    Respiratory:  Negative    Cardiovascular:  Negative    Gastroenterology: Positive for excessive gas or bloating  Genitourinary:  Negative    Musculoskeletal:  Positive for neck pain  Neurologic:  Negative    Psychiatric:  Negative    Heme/Lymph/Imm:  Negative    Endocrine:  Negative      Physical Exam:  Vitals: /84  Pulse 63  Ht 1.727 m (5' 8\")  Wt 96.7 kg (213 lb 3.2 oz)  BMI 32.42 kg/m2   Please refer to dictation for physical exam    Recent Lab Results:  LIPID RESULTS:  Lab Results   Component Value Date    CHOL 179 10/25/2017    HDL 39 (L) 10/25/2017    LDL 98 10/25/2017    TRIG 210 (H) 10/25/2017       LIVER ENZYME RESULTS:  No results found for: AST, ALT    CBC RESULTS:  Lab Results   Component Value Date    WBC 10.9 10/25/2017    RBC 4.93 10/25/2017    HGB 14.6 10/25/2017    HCT 41.8 10/25/2017    MCV 85 10/25/2017    MCH 29.6 10/25/2017    MCHC 34.9 10/25/2017    RDW 13.5 10/25/2017     10/25/2017       BMP RESULTS:  Lab Results   Component Value Date     11/09/2017    POTASSIUM 4.1 11/09/2017    CHLORIDE 100 11/09/2017    CO2 29 11/09/2017    ANIONGAP 12.1 11/09/2017     (H) 11/09/2017    BUN 16 11/09/2017    CR 1.07 11/09/2017    GFRESTIMATED 73 11/09/2017    GFRESTBLACK 89 11/09/2017    JACEK 9.8 11/09/2017        A1C RESULTS:  No results found for: A1C    INR RESULTS:  Lab Results   Component Value Date    INR 0.92 10/24/2017           [facility]  [address]  [address]  [place] [address]      "

## 2017-11-09 NOTE — LETTER
11/9/2017    [facility]  [address]  [facility] [address]      RE: Sachin Cristaldavid Duc       Dear Colleague,    I had the pleasure of seeing Sachin Xavier in the [facility].    PRIMARY CARDIOLOGIST:  Dr. Farnsworth.      REASON FOR VISIT:  Inferior STEMI followup.      HISTORY OF PRESENT ILLNESS:  Mr. Gianna Xavier is a delightful 47-year-old gentleman who had past medical history significant for hypertension, hyperlipidemia and strong family history with his father having a 5-vessel CAB in his late 40s who presented with substernal chest pain radiating to his left arm and was found to have an inferior STEMI.  He had an acute thrombotic lesion of his RCA and was treated with a drug-eluting stent x2 with a 3.5 x 38 mm Promus drug-eluting stent for the proximal and mid RCA and a 4.0 x 20 mm Promus drug-eluting stent for the remainder of the proximal RCA overlapping.  He otherwise had a 50% LAD lesion, a left main without significant disease, a circ without significant disease.  His troponins peaked at 18.  There was a possible clot versus trabeculation on his LV-gram, and therefore he went on to have a cardiac MRI.  This did not show LV thrombus, showed an EF of 59% with mild to moderate hypokinesis in the inferior wall with predominantly viable inferior wall, with the LAD and circ territory, of course, completely viable.  The total scar burden was about 3%.  Again, no thrombus visualized.      Since discharge, there were a few missteps.  One was he was taking 2 Plavix a day instead of 1.  There was a concern at one point that this caused some neck pain, and he was switched over to Brilinta.  He then found out that his neck pain was actually muscle related, and then he stayed on Plavix.  He is currently taking Plavix 75 mg once a day as appropriate.  He did have an ER visit at [facility] for his neck pain, and he was treated with  muscle relaxants as he had lot of tension there, and this was completely relieved.  This was in his right neck and shoulder.  He differentiates this completely from time of MI.  In addition, during cardiac rehab he was noted to have some anterior T-wave inversion in lead V1.  Given he was asymptomatic, we watched that.  Of note, he also had an EKG done at [facility] on 10/30.  This showed Q-waves in III and aVF, but upright T waves in the anterior and lateral leads with just inverted T waves inferiorly.  The inferior T waves were different from previous.      Today, he comes in for routine followup.  He tells me he feels great.  He has no chest pain, shortness of breath, orthopnea or PND.  He is doing cardiac rehab, and it is a little bit too easy for him.  He is taking his medications as planned.  He is trying to eat healthier and exercise, although he is frustrated he is not losing weight.  He says he was seen by his primary and was told that his blood sugars were high, but if he just exercises a little bit that should get better.  He was never told he has diabetes.      SOCIAL HISTORY:  He is a .  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  Prior to admission, he worked from about 2 a.m. to 5:00 p.m. 6-7 days a week.  He averaged about 25-30,000 steps a day.  He did this for about a year on end.  He is a lifelong nonsmoker.  He has occasional alcohol.  He [place] in [place] [facility], moved here when he was 28.  He is .  His wife, Elana, comes in with him today.  She works in human rights.  They have 2 children in their 20s.      PHYSICAL EXAMINATION:   GENERAL:  Well-developed, well-nourished gentleman in no acute distress.   HEENT:  Normocephalic, atraumatic.   HEART:  Regular in rate and rhythm, no murmur, rub or gallop.   LUNGS:  Clear bilaterally.  Carotids are quiet.   EXTREMITIES:  Right vascular access site  is clean, dry and intact, well healed with good pulses.  Extremities without peripheral edema.   SKIN:  Warm and dry.      ASSESSMENT AND PLAN:   1.  Acute inferior STEMI with drug-eluting stent x2 in the proximal to mid RCA.  He has just a 50% LAD lesion remaining.  No significant disease in the left main or circumflex.  I spent a long time today talking about the pathophysiology of a STEMI and the benefits of statins and other medications going forward.  We also discussed that on cardiac MRI he has only a 3% scar burden and expect him to have a full recovery without limitations.  He did do fairly long weeks at work.  We discussed that he is changing roles when he returns.  He will remain off work until after Thanksgiving, so that he has time to do cardiac rehab.  After that he will be limited to 45 hours a week so that he can have a healthy lifestyle.  I did encourage him to at least have 2 days a week off and try not to work more than 10 hours a day.   2.  Hypertension, reasonable control today, although would like it in the 110s if possible.  We will reassess at next visit.   3.  Dyslipidemia, on Lipitor 80.  He was only on Lipitor 40 when he came in.  I expect it will improve, will recheck in 2 months.  If not, we would put him on Crestor 40.   4.  New diagnosis of type 2 diabetes.  He does have a hemoglobin A1c of 7.0.  He is watching his diet.  There is no indication for medications at this time.  I would ask his primary care provider, Dr. Peng Rosen, though, to consider starting him on Jardiance, as this does have a mortality benefit in our type 2 diabetes patients of about 38%.  I did also emphasize that he needs to take this very seriously and get his weight down, watch his diet and exercise, and these numbers will all likely improve.      Thank you for allowing me to participate in this delightful patient's care.  I will have him follow up with me in about a month, and then we will have him get in with  Dr. Farnsworth in about 3 months, sooner with concerns.       Outpatient Encounter Prescriptions as of 11/9/2017   Medication Sig Dispense Refill     clopidogrel (PLAVIX) 75 MG tablet Take 1 tablet (75 mg) by mouth daily 90 tablet 3     aspirin EC 81 MG EC tablet Take 1 tablet (81 mg) by mouth daily       nitroGLYcerin (NITROSTAT) 0.4 MG sublingual tablet For chest pain place 1 tablet under the tongue every 5 minutes for 3 doses. If symptoms persist 5 minutes after 1st dose call 911. 25 tablet 3     atorvastatin (LIPITOR) 80 MG tablet Take 1 tablet (80 mg) by mouth daily 90 tablet 3     carvedilol (COREG) 6.25 MG tablet Take 1 tablet (6.25 mg) by mouth 2 times daily 180 tablet 3     ranitidine (ZANTAC) 150 MG tablet Take 1 tablet (150 mg) by mouth every 12 hours 60 tablet 3     lisinopril (PRINIVIL/ZESTRIL) 10 MG tablet Take 1 tablet (10 mg) by mouth 2 times daily 180 tablet 3     No facility-administered encounter medications on file as of 11/9/2017.      Again, thank you for allowing me to participate in the care of your patient.      Sincerely,    Esperanza Higginbotham PA-C     [facility]

## 2017-11-09 NOTE — PATIENT INSTRUCTIONS
Thanks for coming into [facility] today.    We discussed: you have a diagnosis of diabetes, please watch your sugar intake.      Medication changes:  Continue current medications.      Results:   Component      Latest Ref Rng & Units 10/25/2017 11/9/2017   Sodium      136 - 145 mmol/L  137   Potassium      3.5 - 5.1 mmol/L  4.1   Chloride      98 - 107 mmol/L  100   Carbon Dioxide      23 - 29 mmol/L  29   Anion Gap      6 - 17 mmol/L  12.1   Glucose      70 - 105 mg/dL  170 (H)   Urea Nitrogen      7 - 30 mg/dL  16   Creatinine      0.70 - 1.30 mg/dL  1.07   GFR Estimate      >60 mL/min/1.7m2  73   GFR Estimate If Black      >60 mL/min/1.7m2  89   Calcium      8.5 - 10.5 mg/dL  9.8   Cholesterol      <200 mg/dL 179    Triglycerides      <150 mg/dL 210 (H)    HDL Cholesterol      >39 mg/dL 39 (L)    LDL Cholesterol Calculated      <100 mg/dL 98    Non HDL Cholesterol      <130 mg/dL 140 (H)      Follow up: with me in 1 month and Dr. Farnsworth in 3 months.      Please call my nurse at [phone] with any questions or concerns.    Scheduling phone number: [phone]  Reminder: Please bring in all current medications, over the counter supplements and vitamin bottles to your next appointment.

## 2017-11-09 NOTE — MR AVS SNAPSHOT
After Visit Summary   11/9/2017    Sachin Xavier    MRN: [id number]           Patient Information     Date Of Birth          1970        Visit Information        Provider Department      11/9/2017 10:10 AM Esperanza Higginbotham PA-C [facility]   [place]        Today's Diagnoses     STEMI involving right coronary artery (H)    -  1      Care Instructions    Thanks for coming into [facility] Heart clinic today.    We discussed: you have a diagnosis of diabetes, please watch your sugar intake.      Medication changes:  Continue current medications.      Results:   Component      Latest Ref Rng & Units 10/25/2017 11/9/2017   Sodium      136 - 145 mmol/L  137   Potassium      3.5 - 5.1 mmol/L  4.1   Chloride      98 - 107 mmol/L  100   Carbon Dioxide      23 - 29 mmol/L  29   Anion Gap      6 - 17 mmol/L  12.1   Glucose      70 - 105 mg/dL  170 (H)   Urea Nitrogen      7 - 30 mg/dL  16   Creatinine      0.70 - 1.30 mg/dL  1.07   GFR Estimate      >60 mL/min/1.7m2  73   GFR Estimate If Black      >60 mL/min/1.7m2  89   Calcium      8.5 - 10.5 mg/dL  9.8   Cholesterol      <200 mg/dL 179    Triglycerides      <150 mg/dL 210 (H)    HDL Cholesterol      >39 mg/dL 39 (L)    LDL Cholesterol Calculated      <100 mg/dL 98    Non HDL Cholesterol      <130 mg/dL 140 (H)      Follow up: with me in 1 month and Dr. Farnsworth in 3 months.      Please call my nurse at [phone] with any questions or concerns.    Scheduling phone number: [phone]  Reminder: Please bring in all current medications, over the counter supplements and vitamin bottles to your next appointment.                Follow-ups after your visit        Additional Services     Follow-Up with Cardiac Advanced Practice Provider           Follow-Up with Cardiologist                 Your next 10 appointments already scheduled     Nov 10, 2017  8:00 AM CST   Cardiac Treatment with  Cardiac Rehab 1    [facility] ([facility])    [address]  [place] [address]   [phone]            Nov 10, 2017  9:00 AM CST   CONSULT with Sh Cardiac Rehab 1   [facility] ([facility]    [address]  [place] [address]   [phone]            Nov 13, 2017  8:00 AM CST   Cardiac Treatment with Sh Cardiac Rehab 1   [facility] ([facility]    [address]  [place] [address]   [phone]            Nov 14, 2017  6:30 AM CST   Cardiac Treatment with Sh Cardiac Rehab 1   [facility] ([facility]    [address]  [place] [address]   [phone]            Nov 15, 2017  8:00 AM CST   Cardiac Treatment with Sh Cardiac Rehab 1   [facility] ([facility])    [address]  [place] [address]   [phone]            Nov 16, 2017  6:30 AM CST   Cardiac Treatment with Sh Cardiac Rehab 1   [facility] ([facility])    [address]  [place] [address]   [phone]            Nov 17, 2017  8:00 AM CST   Cardiac Treatment with Sh Cardiac Rehab 1   [facility] ([facility])    [address]  [place] [address]   [phone]            Nov 20, 2017  8:00 AM CST   Cardiac Treatment with Sh Cardiac Rehab 1   [facility] ([facility]    [address]  [place] [address]   [phone]            Nov 21, 2017  6:30 AM CST   Cardiac Treatment with Sh Cardiac Rehab 1   [facility] ([facility])    [address]  [facility] [address]   [phone]            Nov 22, 2017  8:00 AM CST   Cardiac Treatment with  Cardiac Rehab 1   [facility] ([facility])    [address]  [place] [address] [phone]              Future tests that were ordered for you today     Open Future Orders        Priority Expected Expires Ordered    Follow-Up with Cardiologist Routine 2/7/2018 11/9/2018 11/9/2017    Lipid Profile Routine 2/7/2018 11/9/2018 11/9/2017    ALT Routine 2/7/2018 11/9/2018 11/9/2017    Basic metabolic panel Routine 12/9/2017 11/9/2018 11/9/2017    Follow-Up with Cardiac Advanced Practice Provider Routine 12/9/2017 11/9/2018 11/9/2017            Who to contact     If you have questions or need follow up information about today's clinic visit or your schedule please contact [facility]   [place] directly at [phone].  Normal or non-critical lab and imaging results will be communicated to you by [email or web address].[email or web address], letter or phone within 4 business days after the clinic has received the results. If you do not hear from us within 7 days, please contact the clinic through [email or web address] or phone. If you have a critical or abnormal lab result, we will notify you by phone as soon as possible.  Submit refill requests through RatherGather or call your pharmacy and they will forward the refill request to us. Please allow 3 business days for your refill to be completed.          Additional Information About Your Visit        RatherGather Information     RatherGather lets you send messages to your doctor, view your test results, renew your prescriptions, schedule appointments and more. To sign up, go to [email or web address] . Click on \"Log in\" on the left side of the screen, which will take you to the Welcome page. Then click on \"Sign up Now\" on the right side of the page.     You will be asked to enter the access code listed below, as well as some personal information. Please follow the directions to create your username and password.     Your access code is: [email or web address]  Expires: 1/23/2018 10:20 " "AM     Your access code will  in 90 days. If you need help or a new code, please call your [place] clinic or [phone].        Care EveryWhere ID     This is your Care EveryWhere ID. This could be used by other organizations to access your [place] medical records  [id number]        Your Vitals Were     Pulse Height BMI (Body Mass Index)             63 1.727 m (5' 8\") 32.42 kg/m2          Blood Pressure from Last 3 Encounters:   17 128/84   10/26/17 (!) 121/94    Weight from Last 3 Encounters:   17 96.7 kg (213 lb 3.2 oz)   10/25/17 94.4 kg (208 lb 1.8 oz)               Primary Care Provider Office Phone # Fax #    [facility] [phone] [address]  [facility] [address]        Equal Access to Services     MAGDA WRIGHT : Hadii aad ku hadasho Soomaali, waaxda luqadaha, qaybta kaalmada adeegyada, waxay idiin haycristaln ernie nunez . So [facility] [phone].    ATENCIÓN: Si habla español, tiene a pickett disposición servicios gratuitos de asistencia lingüística. Llame al [phone].    We comply with applicable federal civil rights laws and [place] laws. We do not discriminate on the basis of race, color, national origin, age, disability, sex, sexual orientation, or gender identity.            Thank you!     Thank you for choosing [facility]  for your care. Our goal is always to provide you with excellent care. Hearing back from our patients is one way we can continue to improve our services. Please take a few minutes to complete the written survey that you may receive in the mail after your visit with us. Thank you!             Your Updated Medication List - Protect others around you: Learn how to safely use, store and throw away your medicines at www.disposemymeds.org.          This list is accurate as of: 17 11:09 AM.  Always use your most recent med list.                   Brand " Name Dispense Instructions for use Diagnosis    aspirin 81 MG EC tablet      Take 1 tablet (81 mg) by mouth daily    ST elevation myocardial infarction involving right coronary artery (H)       atorvastatin 80 MG tablet    LIPITOR    90 tablet    Take 1 tablet (80 mg) by mouth daily    Hyperlipidemia LDL goal <70       carvedilol 6.25 MG tablet    COREG    180 tablet    Take 1 tablet (6.25 mg) by mouth 2 times daily    ST elevation myocardial infarction involving right coronary artery (H)       lisinopril 10 MG tablet    PRINIVIL/ZESTRIL    180 tablet    Take 1 tablet (10 mg) by mouth 2 times daily    ST elevation myocardial infarction involving right coronary artery (H)       nitroGLYcerin 0.4 MG sublingual tablet    NITROSTAT    25 tablet    For chest pain place 1 tablet under the tongue every 5 minutes for 3 doses. If symptoms persist 5 minutes after 1st dose call 911.    ST elevation myocardial infarction involving right coronary artery (H)       PLAVIX 75 MG tablet   Generic drug:  clopidogrel     90 tablet    Take 1 tablet (75 mg) by mouth daily        ranitidine 150 MG tablet    ZANTAC    60 tablet    Take 1 tablet (150 mg) by mouth every 12 hours    ST elevation myocardial infarction involving right coronary artery (H)

## 2017-11-10 ENCOUNTER — HOSPITAL ENCOUNTER (OUTPATIENT)
Dept: CARDIAC REHAB | Facility: CLINIC | Age: 47
End: 2017-11-10
Attending: NURSE PRACTITIONER
Payer: COMMERCIAL

## 2017-11-10 PROCEDURE — 93797 PHYS/QHP OP CAR RHAB WO ECG: CPT

## 2017-11-10 PROCEDURE — 93798 PHYS/QHP OP CAR RHAB W/ECG: CPT | Performed by: OCCUPATIONAL THERAPIST

## 2017-11-10 PROCEDURE — 40000116 ZZH STATISTIC OP CR VISIT: Performed by: OCCUPATIONAL THERAPIST

## 2017-11-10 PROCEDURE — 40000575 ZZH STATISTIC OP CARDIAC VISIT #2

## 2017-11-10 NOTE — PROGRESS NOTES
PRIMARY CARDIOLOGIST:  Dr. Farnsworth.      REASON FOR VISIT:  Inferior STEMI followup.      HISTORY OF PRESENT ILLNESS:  Mr. Gianna Xavier is a delightful 47-year-old gentleman who had past medical history significant for hypertension, hyperlipidemia and strong family history with his father having a 5-vessel CAB in his late 40s who presented with substernal chest pain radiating to his left arm and was found to have an inferior STEMI.  He had an acute thrombotic lesion of his RCA and was treated with a drug-eluting stent x2 with a 3.5 x 38 mm Promus drug-eluting stent for the proximal and mid RCA and a 4.0 x 20 mm Promus drug-eluting stent for the remainder of the proximal RCA overlapping.  He otherwise had a 50% LAD lesion, a left main without significant disease, a circ without significant disease.  His troponins peaked at 18.  There was a possible clot versus trabeculation on his LV-gram, and therefore he went on to have a cardiac MRI.  This did not show LV thrombus, showed an EF of 59% with mild to moderate hypokinesis in the inferior wall with predominantly viable inferior wall, with the LAD and circ territory, of course, completely viable.  The total scar burden was about 3%.  Again, no thrombus visualized.      Since discharge, there were a few missteps.  One was he was taking 2 Plavix a day instead of 1.  There was a concern at one point that this caused some neck pain, and he was switched over to Brilinta.  He then found out that his neck pain was actually muscle related, and then he stayed on Plavix.  He is currently taking Plavix 75 mg once a day as appropriate.  He did have an ER visit at [facility] for his neck pain, and he was treated with muscle relaxants as he had lot of tension there, and this was completely relieved.  This was in his right neck and shoulder.  He differentiates this completely from time of MI.  In addition, during cardiac rehab he was noted to have some anterior T-wave inversion  in lead V1.  Given he was asymptomatic, we watched that.  Of note, he also had an EKG done at [facility] on 10/30.  This showed Q-waves in III and aVF, but upright T waves in the anterior and lateral leads with just inverted T waves inferiorly.  The inferior T waves were different from previous.      Today, he comes in for routine followup.  He tells me he feels great.  He has no chest pain, shortness of breath, orthopnea or PND.  He is doing cardiac rehab, and it is a little bit too easy for him.  He is taking his medications as planned.  He is trying to eat healthier and exercise, although he is frustrated he is not losing weight.  He says he was seen by his primary and was told that his blood sugars were high, but if he just exercises a little bit that should get better.  He was never told he has diabetes.      SOCIAL HISTORY:  He is a .  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  Prior to admission, he worked from about 2 a.m. to 5:00 p.m. 6-7 days a week.  He averaged about 25-30,000 steps a day.  He did this for about a year on end.  He is a lifelong nonsmoker.  He has occasional alcohol.  He grew up in [place], moved here when he was 28.  He is .  His wife, Elana, comes in with him today.  She works in human rights.  They have 2 children in their 20s.      PHYSICAL EXAMINATION:   GENERAL:  Well-developed, well-nourished gentleman in no acute distress.   HEENT:  Normocephalic, atraumatic.   HEART:  Regular in rate and rhythm, no murmur, rub or gallop.   LUNGS:  Clear bilaterally.  Carotids are quiet.   EXTREMITIES:  Right vascular access site is clean, dry and intact, well healed with good pulses.  Extremities without peripheral edema.   SKIN:  Warm and dry.      ASSESSMENT AND PLAN:   1.  Acute inferior STEMI with drug-eluting stent x2 in the proximal to mid RCA.  He has just a 50% LAD lesion  remaining.  No significant disease in the left main or circumflex.  I spent a long time today talking about the pathophysiology of a STEMI and the benefits of statins and other medications going forward.  We also discussed that on cardiac MRI he has only a 3% scar burden and expect him to have a full recovery without limitations.  He did do fairly long weeks at work.  We discussed that he is changing roles when he returns.  He will remain off work until after Thanksgiving, so that he has time to do cardiac rehab.  After that he will be limited to 45 hours a week so that he can have a healthy lifestyle.  I did encourage him to at least have 2 days a week off and try not to work more than 10 hours a day.   2.  Hypertension, reasonable control today, although would like it in the 110s if possible.  We will reassess at next visit.   3.  Dyslipidemia, on Lipitor 80.  He was only on Lipitor 40 when he came in.  I expect it will improve, will recheck in 2 months.  If not, we would put him on Crestor 40.   4.  New diagnosis of type 2 diabetes.  He does have a hemoglobin A1c of 7.0.  He is watching his diet.  There is no indication for medications at this time.  I would ask his primary care provider, Dr. Peng Rosen, though, to consider starting him on Jardiance, as this does have a mortality benefit in our type 2 diabetes patients of about 38%.  I did also emphasize that he needs to take this very seriously and get his weight down, watch his diet and exercise, and these numbers will all likely improve.      Thank you for allowing me to participate in this delightful patient's care.  I will have him follow up with me in about a month, and then we will have him get in with Dr. Farnsworth in about 3 months, sooner with concerns.      JAROCHO Bee PA-C             D: 11/09/2017 14:13   T: 11/09/2017 21:43   MT: GINO      Name:     JOSE GODINEZ   MRN:      [id number]        Account:       [id number]   :      1970           Service Date: 2017      Document: [id number]

## 2017-11-13 ENCOUNTER — HOSPITAL ENCOUNTER (OUTPATIENT)
Dept: CARDIAC REHAB | Facility: CLINIC | Age: 47
End: 2017-11-13
Attending: NURSE PRACTITIONER
Payer: COMMERCIAL

## 2017-11-13 PROCEDURE — 40000116 ZZH STATISTIC OP CR VISIT: Performed by: OCCUPATIONAL THERAPIST

## 2017-11-13 PROCEDURE — 93797 PHYS/QHP OP CAR RHAB WO ECG: CPT | Performed by: OCCUPATIONAL THERAPIST

## 2017-11-13 PROCEDURE — 40000575 ZZH STATISTIC OP CARDIAC VISIT #2: Performed by: OCCUPATIONAL THERAPIST

## 2017-11-13 PROCEDURE — 93798 PHYS/QHP OP CAR RHAB W/ECG: CPT | Performed by: OCCUPATIONAL THERAPIST

## 2017-11-14 ENCOUNTER — HOSPITAL ENCOUNTER (OUTPATIENT)
Dept: CARDIAC REHAB | Facility: CLINIC | Age: 47
End: 2017-11-14
Attending: NURSE PRACTITIONER
Payer: COMMERCIAL

## 2017-11-14 PROCEDURE — 40000116 ZZH STATISTIC OP CR VISIT: Performed by: OCCUPATIONAL THERAPIST

## 2017-11-14 PROCEDURE — 93798 PHYS/QHP OP CAR RHAB W/ECG: CPT | Performed by: OCCUPATIONAL THERAPIST

## 2017-11-15 ENCOUNTER — HOSPITAL ENCOUNTER (OUTPATIENT)
Dept: CARDIAC REHAB | Facility: CLINIC | Age: 47
End: 2017-11-15
Attending: NURSE PRACTITIONER
Payer: COMMERCIAL

## 2017-11-15 PROCEDURE — 40000116 ZZH STATISTIC OP CR VISIT

## 2017-11-15 PROCEDURE — 40000575 ZZH STATISTIC OP CARDIAC VISIT #2

## 2017-11-15 PROCEDURE — 93797 PHYS/QHP OP CAR RHAB WO ECG: CPT

## 2017-11-15 PROCEDURE — 93798 PHYS/QHP OP CAR RHAB W/ECG: CPT

## 2017-11-16 ENCOUNTER — HOSPITAL ENCOUNTER (OUTPATIENT)
Dept: CARDIAC REHAB | Facility: CLINIC | Age: 47
End: 2017-11-16
Attending: NURSE PRACTITIONER
Payer: COMMERCIAL

## 2017-11-16 PROCEDURE — 40000116 ZZH STATISTIC OP CR VISIT: Performed by: CLINICAL EXERCISE PHYSIOLOGIST

## 2017-11-16 PROCEDURE — 93798 PHYS/QHP OP CAR RHAB W/ECG: CPT | Performed by: CLINICAL EXERCISE PHYSIOLOGIST

## 2017-11-17 ENCOUNTER — HOSPITAL ENCOUNTER (OUTPATIENT)
Dept: CARDIAC REHAB | Facility: CLINIC | Age: 47
End: 2017-11-17
Attending: NURSE PRACTITIONER
Payer: COMMERCIAL

## 2017-11-17 PROCEDURE — 93798 PHYS/QHP OP CAR RHAB W/ECG: CPT | Performed by: REHABILITATION PRACTITIONER

## 2017-11-17 PROCEDURE — 40000116 ZZH STATISTIC OP CR VISIT: Performed by: REHABILITATION PRACTITIONER

## 2017-11-17 NOTE — ADDENDUM NOTE
Encounter addended by: Gordon Miller EP on: 11/17/2017  2:54 PM<BR>     Actions taken: Charge Capture section accepted

## 2017-11-20 ENCOUNTER — HOSPITAL ENCOUNTER (OUTPATIENT)
Dept: CARDIAC REHAB | Facility: CLINIC | Age: 47
End: 2017-11-20
Attending: NURSE PRACTITIONER
Payer: COMMERCIAL

## 2017-11-20 PROCEDURE — 40000116 ZZH STATISTIC OP CR VISIT: Performed by: OCCUPATIONAL THERAPIST

## 2017-11-20 PROCEDURE — 93798 PHYS/QHP OP CAR RHAB W/ECG: CPT | Performed by: OCCUPATIONAL THERAPIST

## 2017-11-21 ENCOUNTER — HOSPITAL ENCOUNTER (OUTPATIENT)
Dept: CARDIAC REHAB | Facility: CLINIC | Age: 47
End: 2017-11-21
Attending: NURSE PRACTITIONER
Payer: COMMERCIAL

## 2017-11-21 PROCEDURE — 93798 PHYS/QHP OP CAR RHAB W/ECG: CPT

## 2017-11-21 PROCEDURE — 40000116 ZZH STATISTIC OP CR VISIT

## 2017-11-22 ENCOUNTER — HOSPITAL ENCOUNTER (OUTPATIENT)
Dept: CARDIAC REHAB | Facility: CLINIC | Age: 47
End: 2017-11-22
Attending: NURSE PRACTITIONER
Payer: COMMERCIAL

## 2017-11-22 PROCEDURE — 93798 PHYS/QHP OP CAR RHAB W/ECG: CPT | Performed by: REHABILITATION PRACTITIONER

## 2017-11-22 PROCEDURE — 40000116 ZZH STATISTIC OP CR VISIT: Performed by: REHABILITATION PRACTITIONER

## 2017-11-22 PROCEDURE — 40000575 ZZH STATISTIC OP CARDIAC VISIT #2: Performed by: REHABILITATION PRACTITIONER

## 2017-11-22 PROCEDURE — 93797 PHYS/QHP OP CAR RHAB WO ECG: CPT | Performed by: REHABILITATION PRACTITIONER

## 2017-11-24 ENCOUNTER — HOSPITAL ENCOUNTER (OUTPATIENT)
Dept: CARDIAC REHAB | Facility: CLINIC | Age: 47
End: 2017-11-24
Attending: NURSE PRACTITIONER
Payer: COMMERCIAL

## 2017-11-24 PROCEDURE — 40000116 ZZH STATISTIC OP CR VISIT

## 2017-11-24 PROCEDURE — 93798 PHYS/QHP OP CAR RHAB W/ECG: CPT

## 2017-11-27 ENCOUNTER — HOSPITAL ENCOUNTER (OUTPATIENT)
Dept: CARDIAC REHAB | Facility: CLINIC | Age: 47
End: 2017-11-27
Attending: NURSE PRACTITIONER
Payer: COMMERCIAL

## 2017-11-27 PROCEDURE — 40000116 ZZH STATISTIC OP CR VISIT: Performed by: OCCUPATIONAL THERAPIST

## 2017-11-27 PROCEDURE — 93798 PHYS/QHP OP CAR RHAB W/ECG: CPT | Performed by: OCCUPATIONAL THERAPIST

## 2017-11-28 ENCOUNTER — HOSPITAL ENCOUNTER (OUTPATIENT)
Dept: CARDIAC REHAB | Facility: CLINIC | Age: 47
End: 2017-11-28
Attending: NURSE PRACTITIONER
Payer: COMMERCIAL

## 2017-11-28 PROCEDURE — 40000116 ZZH STATISTIC OP CR VISIT: Performed by: OCCUPATIONAL THERAPIST

## 2017-11-28 PROCEDURE — 93798 PHYS/QHP OP CAR RHAB W/ECG: CPT | Performed by: OCCUPATIONAL THERAPIST

## 2017-11-29 ENCOUNTER — HOSPITAL ENCOUNTER (OUTPATIENT)
Dept: CARDIAC REHAB | Facility: CLINIC | Age: 47
End: 2017-11-29
Attending: NURSE PRACTITIONER
Payer: COMMERCIAL

## 2017-11-29 PROCEDURE — 93798 PHYS/QHP OP CAR RHAB W/ECG: CPT

## 2017-11-29 PROCEDURE — 40000116 ZZH STATISTIC OP CR VISIT

## 2017-11-30 ENCOUNTER — HOSPITAL ENCOUNTER (OUTPATIENT)
Dept: CARDIAC REHAB | Facility: CLINIC | Age: 47
End: 2017-11-30
Attending: NURSE PRACTITIONER
Payer: COMMERCIAL

## 2017-11-30 PROCEDURE — 93798 PHYS/QHP OP CAR RHAB W/ECG: CPT | Performed by: CLINICAL EXERCISE PHYSIOLOGIST

## 2017-11-30 PROCEDURE — 40000116 ZZH STATISTIC OP CR VISIT: Performed by: CLINICAL EXERCISE PHYSIOLOGIST

## 2017-12-04 ENCOUNTER — HOSPITAL ENCOUNTER (OUTPATIENT)
Dept: CARDIAC REHAB | Facility: CLINIC | Age: 47
End: 2017-12-04
Attending: NURSE PRACTITIONER
Payer: COMMERCIAL

## 2017-12-04 PROCEDURE — 93798 PHYS/QHP OP CAR RHAB W/ECG: CPT | Performed by: OCCUPATIONAL THERAPIST

## 2017-12-04 PROCEDURE — 40000116 ZZH STATISTIC OP CR VISIT: Performed by: OCCUPATIONAL THERAPIST

## 2017-12-05 ENCOUNTER — HOSPITAL ENCOUNTER (OUTPATIENT)
Dept: CARDIAC REHAB | Facility: CLINIC | Age: 47
End: 2017-12-05
Attending: NURSE PRACTITIONER
Payer: COMMERCIAL

## 2017-12-05 PROCEDURE — 40000116 ZZH STATISTIC OP CR VISIT: Performed by: OCCUPATIONAL THERAPIST

## 2017-12-05 PROCEDURE — 93798 PHYS/QHP OP CAR RHAB W/ECG: CPT | Performed by: OCCUPATIONAL THERAPIST

## 2017-12-06 ENCOUNTER — HOSPITAL ENCOUNTER (OUTPATIENT)
Dept: CARDIAC REHAB | Facility: CLINIC | Age: 47
End: 2017-12-06
Attending: NURSE PRACTITIONER
Payer: COMMERCIAL

## 2017-12-06 PROCEDURE — 93798 PHYS/QHP OP CAR RHAB W/ECG: CPT | Performed by: OCCUPATIONAL THERAPIST

## 2017-12-06 PROCEDURE — 40000116 ZZH STATISTIC OP CR VISIT: Performed by: OCCUPATIONAL THERAPIST

## 2017-12-07 ENCOUNTER — HOSPITAL ENCOUNTER (OUTPATIENT)
Dept: CARDIAC REHAB | Facility: CLINIC | Age: 47
End: 2017-12-07
Attending: NURSE PRACTITIONER
Payer: COMMERCIAL

## 2017-12-07 PROCEDURE — 40000116 ZZH STATISTIC OP CR VISIT: Performed by: OCCUPATIONAL THERAPIST

## 2017-12-07 PROCEDURE — 93798 PHYS/QHP OP CAR RHAB W/ECG: CPT | Performed by: OCCUPATIONAL THERAPIST

## 2017-12-08 ENCOUNTER — HOSPITAL ENCOUNTER (OUTPATIENT)
Dept: CARDIAC REHAB | Facility: CLINIC | Age: 47
End: 2017-12-08
Attending: NURSE PRACTITIONER
Payer: COMMERCIAL

## 2017-12-08 PROCEDURE — 40000116 ZZH STATISTIC OP CR VISIT: Performed by: OCCUPATIONAL THERAPIST

## 2017-12-08 PROCEDURE — 93798 PHYS/QHP OP CAR RHAB W/ECG: CPT | Performed by: OCCUPATIONAL THERAPIST

## 2017-12-11 ENCOUNTER — HOSPITAL ENCOUNTER (OUTPATIENT)
Dept: CARDIAC REHAB | Facility: CLINIC | Age: 47
End: 2017-12-11
Attending: NURSE PRACTITIONER
Payer: COMMERCIAL

## 2017-12-11 PROCEDURE — 93798 PHYS/QHP OP CAR RHAB W/ECG: CPT | Performed by: OCCUPATIONAL THERAPIST

## 2017-12-11 PROCEDURE — 40000116 ZZH STATISTIC OP CR VISIT: Performed by: OCCUPATIONAL THERAPIST

## 2017-12-12 ENCOUNTER — HOSPITAL ENCOUNTER (OUTPATIENT)
Dept: CARDIAC REHAB | Facility: CLINIC | Age: 47
End: 2017-12-12
Attending: NURSE PRACTITIONER
Payer: COMMERCIAL

## 2017-12-12 PROCEDURE — 40000116 ZZH STATISTIC OP CR VISIT

## 2017-12-12 PROCEDURE — 93798 PHYS/QHP OP CAR RHAB W/ECG: CPT

## 2017-12-13 ENCOUNTER — HOSPITAL ENCOUNTER (OUTPATIENT)
Dept: CARDIAC REHAB | Facility: CLINIC | Age: 47
End: 2017-12-13
Attending: NURSE PRACTITIONER
Payer: COMMERCIAL

## 2017-12-13 PROCEDURE — 40000116 ZZH STATISTIC OP CR VISIT: Performed by: OCCUPATIONAL THERAPIST

## 2017-12-13 PROCEDURE — 93798 PHYS/QHP OP CAR RHAB W/ECG: CPT | Performed by: OCCUPATIONAL THERAPIST

## 2017-12-15 ENCOUNTER — HOSPITAL ENCOUNTER (OUTPATIENT)
Dept: CARDIAC REHAB | Facility: CLINIC | Age: 47
End: 2017-12-15
Attending: NURSE PRACTITIONER
Payer: COMMERCIAL

## 2017-12-15 PROCEDURE — 93798 PHYS/QHP OP CAR RHAB W/ECG: CPT | Performed by: REHABILITATION PRACTITIONER

## 2017-12-15 PROCEDURE — 40000116 ZZH STATISTIC OP CR VISIT: Performed by: REHABILITATION PRACTITIONER

## 2017-12-18 ENCOUNTER — HOSPITAL ENCOUNTER (OUTPATIENT)
Dept: CARDIAC REHAB | Facility: CLINIC | Age: 47
End: 2017-12-18
Attending: NURSE PRACTITIONER
Payer: COMMERCIAL

## 2017-12-18 ENCOUNTER — OFFICE VISIT (OUTPATIENT)
Dept: CARDIOLOGY | Facility: CLINIC | Age: 47
End: 2017-12-18
Attending: PHYSICIAN ASSISTANT
Payer: COMMERCIAL

## 2017-12-18 VITALS
SYSTOLIC BLOOD PRESSURE: 124 MMHG | HEIGHT: 68 IN | HEART RATE: 70 BPM | WEIGHT: 214.4 LBS | BODY MASS INDEX: 32.49 KG/M2 | DIASTOLIC BLOOD PRESSURE: 84 MMHG

## 2017-12-18 DIAGNOSIS — E78.5 HYPERLIPIDEMIA LDL GOAL <70: ICD-10-CM

## 2017-12-18 DIAGNOSIS — I21.11 STEMI INVOLVING RIGHT CORONARY ARTERY (H): ICD-10-CM

## 2017-12-18 DIAGNOSIS — I21.11 ST ELEVATION MYOCARDIAL INFARCTION INVOLVING RIGHT CORONARY ARTERY (H): ICD-10-CM

## 2017-12-18 LAB
ANION GAP SERPL CALCULATED.3IONS-SCNC: 9.1 MMOL/L (ref 6–17)
BUN SERPL-MCNC: 18 MG/DL (ref 7–30)
CALCIUM SERPL-MCNC: 9.5 MG/DL (ref 8.5–10.5)
CHLORIDE SERPL-SCNC: 104 MMOL/L (ref 98–107)
CO2 SERPL-SCNC: 31 MMOL/L (ref 23–29)
CREAT SERPL-MCNC: 1.18 MG/DL (ref 0.7–1.3)
GFR SERPL CREATININE-BSD FRML MDRD: 66 ML/MIN/1.7M2
GLUCOSE SERPL-MCNC: 106 MG/DL (ref 70–105)
POTASSIUM SERPL-SCNC: 4.1 MMOL/L (ref 3.5–5.1)
SODIUM SERPL-SCNC: 140 MMOL/L (ref 136–145)

## 2017-12-18 PROCEDURE — 80061 LIPID PANEL: CPT | Performed by: PHYSICIAN ASSISTANT

## 2017-12-18 PROCEDURE — 40000116 ZZH STATISTIC OP CR VISIT

## 2017-12-18 PROCEDURE — 80048 BASIC METABOLIC PNL TOTAL CA: CPT | Performed by: PHYSICIAN ASSISTANT

## 2017-12-18 PROCEDURE — 36415 COLL VENOUS BLD VENIPUNCTURE: CPT | Performed by: PHYSICIAN ASSISTANT

## 2017-12-18 PROCEDURE — 99214 OFFICE O/P EST MOD 30 MIN: CPT | Performed by: PHYSICIAN ASSISTANT

## 2017-12-18 PROCEDURE — 93798 PHYS/QHP OP CAR RHAB W/ECG: CPT

## 2017-12-18 RX ORDER — LISINOPRIL 10 MG/1
10 TABLET ORAL 2 TIMES DAILY
Qty: 180 TABLET | Refills: 3 | Status: SHIPPED | OUTPATIENT
Start: 2017-12-18 | End: 2018-03-08

## 2017-12-18 RX ORDER — NITROGLYCERIN 0.4 MG/1
TABLET SUBLINGUAL
Qty: 25 TABLET | Refills: 3 | Status: SHIPPED | OUTPATIENT
Start: 2017-12-18 | End: 2019-07-08

## 2017-12-18 RX ORDER — CARVEDILOL 6.25 MG/1
6.25 TABLET ORAL 2 TIMES DAILY
Qty: 180 TABLET | Refills: 3 | Status: SHIPPED | OUTPATIENT
Start: 2017-12-18 | End: 2019-01-18

## 2017-12-18 RX ORDER — ATORVASTATIN CALCIUM 80 MG/1
80 TABLET, FILM COATED ORAL DAILY
Qty: 90 TABLET | Refills: 3 | Status: SHIPPED | OUTPATIENT
Start: 2017-12-18 | End: 2019-01-17

## 2017-12-18 RX ORDER — CLOPIDOGREL BISULFATE 75 MG/1
75 TABLET ORAL DAILY
Qty: 90 TABLET | Refills: 3 | Status: SHIPPED | OUTPATIENT
Start: 2017-12-18 | End: 2020-04-03

## 2017-12-18 NOTE — MR AVS SNAPSHOT
After Visit Summary   12/18/2017    Sachin Xavier    MRN: [id number]           Patient Information     Date Of Birth          1970        Visit Information        Provider Department      12/18/2017 3:50 PM Anhay, Esperanza Mcclellan PA-C [facility]   [place]        Today's Diagnoses     STEMI involving right coronary artery (H)        ST elevation myocardial infarction involving right coronary artery (H)        Hyperlipidemia LDL goal <70          Care Instructions    Thanks for coming into [facility] Heart clinic today.    We discussed: Ok to return to soccer.  Continue exercising, and eating healthy.      Talk to Dr. Rosen about Jardiance for your diabetes.      Medication changes:  Ok to stop taking ranitidine.    Continue other medications.      Results:   Component      Latest Ref Rng & Units 12/18/2017   Sodium      136 - 145 mmol/L 140   Potassium      3.5 - 5.1 mmol/L 4.1   Chloride      98 - 107 mmol/L 104   Carbon Dioxide      23 - 29 mmol/L 31 (H)   Anion Gap      6 - 17 mmol/L 9.1   Glucose      70 - 105 mg/dL 106 (H)   Urea Nitrogen      7 - 30 mg/dL 18   Creatinine      0.70 - 1.30 mg/dL 1.18   GFR Estimate      >60 mL/min/1.7m2 66   GFR Estimate If Black      >60 mL/min/1.7m2 79   Calcium      8.5 - 10.5 mg/dL 9.5     Follow up: with Dr. Farnsworth in about 2-3 months.       Please call my nurse at [phone] with any questions or concerns.    Scheduling phone number: [phone]  Reminder: Please bring in all current medications, over the counter supplements and vitamin bottles to your next appointment.                  Follow-ups after your visit        Additional Services     Follow-Up with Cardiologist                 Future tests that were ordered for you today     Open Future Orders        Priority Expected Expires Ordered    Follow-Up with Cardiologist Routine 3/18/2018 12/18/2018 12/18/2017            Who to contact     If you  "have questions or need follow up information about today's clinic visit or your schedule please contact [facility] HEART [facility] directly at [phone].  Normal or non-critical lab and imaging results will be communicated to you by MyChart, letter or phone within 4 business days after the clinic has received the results. If you do not hear from us within 7 days, please contact the clinic through Popsethart or phone. If you have a critical or abnormal lab result, we will notify you by phone as soon as possible.  Submit refill requests through TNC or call your pharmacy and they will forward the refill request to us. Please allow 3 business days for your refill to be completed.          Additional Information About Your Visit        Pops[employer] Information     TNC lets you send messages to your doctor, view your test results, renew your prescriptions, schedule appointments and more. To sign up, go to [email or web address] . Click on \"Log in\" on the left side of the screen, which will take you to the Welcome page. Then click on \"Sign up Now\" on the right side of the page.     You will be asked to enter the access code listed below, as well as some personal information. Please follow the directions to create your username and password.     Your access code is: [email or web address]  Expires: 2018 10:20 AM     Your access code will  in 90 days. If you need help or a new code, please call your [place] clinic or [phone].        Care EveryWhere ID     This is your Care EveryWhere ID. This could be used by other organizations to access your [place] medical records  [id number]        Your Vitals Were     Pulse Height BMI (Body Mass Index)             70 1.727 m (5' 8\") 32.6 kg/m2          Blood Pressure from Last 3 Encounters:   17 124/84   17 128/84   10/26/17 (!) 121/94    Weight from Last 3 Encounters:   17 97.3 kg (214 lb 6.4 oz)   17 96.7 kg (213 lb 3.2 oz) "   10/25/17 94.4 kg (208 lb 1.8 oz)              We Performed the Following     Follow-Up with Cardiac Advanced Practice Provider     Lipid panel reflex to direct LDL Non-fasting          Today's Medication Changes          These changes are accurate as of: 12/18/17  4:42 PM.  If you have any questions, ask your nurse or doctor.               Stop taking these medicines if you haven't already. Please contact your care team if you have questions.     ranitidine 150 MG tablet   Commonly known as:  ZANTAC   Stopped by:  Esperanza Higginbotham PA-C                Where to get your medicines      These medications were sent to [facility] [place] [facility] [address] [facility] [address]  [address] [facility] [address], [place] [address]     Phone:  [phone]     atorvastatin 80 MG tablet    carvedilol 6.25 MG tablet    clopidogrel 75 MG tablet    lisinopril 10 MG tablet    nitroGLYcerin 0.4 MG sublingual tablet                Primary Care Provider Office Phone # Fax #    Peng Rosen [phone]       [facility] [place] [facility] [address] [place] [address]        Equal Access to Services     MAGDA WRIGHT : Hadii aad ku hadasho Soomaali, waaxda luqadaha, qaybta kaalmada adeegyada, waxay idiin hayaan adeeg kharash lagideon ah. So [facility] [phone].    ATENCIÓN: Si habla español, tiene a pickett disposición servicios gratarvindos de asistencia lingüística. Dimtirios al [phone].    We comply with applicable federal civil rights laws and [place] laws. We do not discriminate on the basis of race, color, national origin, age, disability, sex, sexual orientation, or gender identity.            Thank you!     Thank you for choosing [facility]  for your care. Our goal is always to provide you with excellent care. Hearing back from our patients is one way we can continue to improve our services. Please take a few minutes to complete the written  survey that you may receive in the mail after your visit with us. Thank you!             Your Updated Medication List - Protect others around you: Learn how to safely use, store and throw away your medicines at www.disposemymeds.org.          This list is accurate as of: 12/18/17  4:42 PM.  Always use your most recent med list.                   Brand Name Dispense Instructions for use Diagnosis    aspirin 81 MG EC tablet      Take 1 tablet (81 mg) by mouth daily    ST elevation myocardial infarction involving right coronary artery (H)       atorvastatin 80 MG tablet    LIPITOR    90 tablet    Take 1 tablet (80 mg) by mouth daily    Hyperlipidemia LDL goal <70       carvedilol 6.25 MG tablet    COREG    180 tablet    Take 1 tablet (6.25 mg) by mouth 2 times daily    ST elevation myocardial infarction involving right coronary artery (H)       clopidogrel 75 MG tablet    PLAVIX    90 tablet    Take 1 tablet (75 mg) by mouth daily    STEMI involving right coronary artery (H)       lisinopril 10 MG tablet    PRINIVIL/ZESTRIL    180 tablet    Take 1 tablet (10 mg) by mouth 2 times daily    ST elevation myocardial infarction involving right coronary artery (H)       nitroGLYcerin 0.4 MG sublingual tablet    NITROSTAT    25 tablet    For chest pain place 1 tablet under the tongue every 5 minutes for 3 doses. If symptoms persist 5 minutes after 1st dose call 911.    ST elevation myocardial infarction involving right coronary artery (H)

## 2017-12-18 NOTE — PROGRESS NOTES
[id number]  HPI and Plan:   See dictation    Orders this Visit:  Orders Placed This Encounter   Procedures     Lipid panel reflex to direct LDL Non-fasting     Follow-Up with Cardiologist     Orders Placed This Encounter   Medications     clopidogrel (PLAVIX) 75 MG tablet     Sig: Take 1 tablet (75 mg) by mouth daily     Dispense:  90 tablet     Refill:  3     nitroGLYcerin (NITROSTAT) 0.4 MG sublingual tablet     Sig: For chest pain place 1 tablet under the tongue every 5 minutes for 3 doses. If symptoms persist 5 minutes after 1st dose call 911.     Dispense:  25 tablet     Refill:  3     atorvastatin (LIPITOR) 80 MG tablet     Sig: Take 1 tablet (80 mg) by mouth daily     Dispense:  90 tablet     Refill:  3     carvedilol (COREG) 6.25 MG tablet     Sig: Take 1 tablet (6.25 mg) by mouth 2 times daily     Dispense:  180 tablet     Refill:  3     lisinopril (PRINIVIL/ZESTRIL) 10 MG tablet     Sig: Take 1 tablet (10 mg) by mouth 2 times daily     Dispense:  180 tablet     Refill:  3     Medications Discontinued During This Encounter   Medication Reason     ranitidine (ZANTAC) 150 MG tablet      clopidogrel (PLAVIX) 75 MG tablet Reorder     nitroGLYcerin (NITROSTAT) 0.4 MG sublingual tablet Reorder     atorvastatin (LIPITOR) 80 MG tablet Reorder     carvedilol (COREG) 6.25 MG tablet Reorder     lisinopril (PRINIVIL/ZESTRIL) 10 MG tablet Reorder         Encounter Diagnoses   Name Primary?     STEMI involving right coronary artery (H)      ST elevation myocardial infarction involving right coronary artery (H)      Hyperlipidemia LDL goal <70        CURRENT MEDICATIONS:  Current Outpatient Prescriptions   Medication Sig Dispense Refill     clopidogrel (PLAVIX) 75 MG tablet Take 1 tablet (75 mg) by mouth daily 90 tablet 3     nitroGLYcerin (NITROSTAT) 0.4 MG sublingual tablet For chest pain place 1 tablet under the tongue every 5 minutes for 3 doses. If symptoms persist 5 minutes after 1st dose call 911. 25 tablet 3      atorvastatin (LIPITOR) 80 MG tablet Take 1 tablet (80 mg) by mouth daily 90 tablet 3     carvedilol (COREG) 6.25 MG tablet Take 1 tablet (6.25 mg) by mouth 2 times daily 180 tablet 3     lisinopril (PRINIVIL/ZESTRIL) 10 MG tablet Take 1 tablet (10 mg) by mouth 2 times daily 180 tablet 3     aspirin EC 81 MG EC tablet Take 1 tablet (81 mg) by mouth daily       [DISCONTINUED] clopidogrel (PLAVIX) 75 MG tablet Take 1 tablet (75 mg) by mouth daily 90 tablet 3     [DISCONTINUED] nitroGLYcerin (NITROSTAT) 0.4 MG sublingual tablet For chest pain place 1 tablet under the tongue every 5 minutes for 3 doses. If symptoms persist 5 minutes after 1st dose call 911. 25 tablet 3     [DISCONTINUED] atorvastatin (LIPITOR) 80 MG tablet Take 1 tablet (80 mg) by mouth daily 90 tablet 3     [DISCONTINUED] carvedilol (COREG) 6.25 MG tablet Take 1 tablet (6.25 mg) by mouth 2 times daily 180 tablet 3     [DISCONTINUED] lisinopril (PRINIVIL/ZESTRIL) 10 MG tablet Take 1 tablet (10 mg) by mouth 2 times daily 180 tablet 3       ALLERGIES   No Known Allergies    PAST MEDICAL HISTORY:  Past Medical History:   Diagnosis Date     Hyperlipidemia      Hypertension        PAST SURGICAL HISTORY:  Past Surgical History:   Procedure Laterality Date     HERNIA REPAIR       ORTHOPEDIC SURGERY      acl repair       FAMILY HISTORY:  Family History   Problem Relation Age of Onset     Coronary Artery Disease Father      Brain Tumor Father      DIABETES Father      Coronary Artery Disease Paternal Grandmother      Coronary Artery Disease Paternal Grandfather      Parkinsonism Mother      Family History Negative Maternal Grandmother      Family History Negative Maternal Grandfather      Family History Negative Brother      Family History Negative Son      Family History Negative Daughter        SOCIAL HISTORY:  Social History     Social History     Marital status:      Spouse name: N/A     Number of children: N/A     Years of education: N/A     Social  "History Main Topics     Smoking status: Never Smoker     Smokeless tobacco: Never Used     Alcohol use Yes      Comment: rare     Drug use: No     Sexual activity: Not Asked     Other Topics Concern     None     Social History Narrative       Review of Systems:  Skin:  Negative     Eyes:  Positive for glasses  ENT:  Negative    Respiratory:  Negative    Cardiovascular:  Negative    Gastroenterology: Positive for excessive gas or bloating  Genitourinary:  Negative    Musculoskeletal:  Positive for neck pain  Neurologic:  Negative    Psychiatric:  Negative    Heme/Lymph/Imm:  Negative    Endocrine:  Negative      Physical Exam:  Vitals: /84  Pulse 70  Ht 1.727 m (5' 8\")  Wt 97.3 kg (214 lb 6.4 oz)  BMI 32.6 kg/m2   Please refer to dictation for physical exam    Recent Lab Results:  LIPID RESULTS:  Lab Results   Component Value Date    CHOL 179 10/25/2017    HDL 39 (L) 10/25/2017    LDL 98 10/25/2017    TRIG 210 (H) 10/25/2017       LIVER ENZYME RESULTS:  No results found for: AST, ALT    CBC RESULTS:  Lab Results   Component Value Date    WBC 10.9 10/25/2017    RBC 4.93 10/25/2017    HGB 14.6 10/25/2017    HCT 41.8 10/25/2017    MCV 85 10/25/2017    MCH 29.6 10/25/2017    MCHC 34.9 10/25/2017    RDW 13.5 10/25/2017     10/25/2017       BMP RESULTS:  Lab Results   Component Value Date     12/18/2017    POTASSIUM 4.1 12/18/2017    CHLORIDE 104 12/18/2017    CO2 31 (H) 12/18/2017    ANIONGAP 9.1 12/18/2017     (H) 12/18/2017    BUN 18 12/18/2017    CR 1.18 12/18/2017    GFRESTIMATED 66 12/18/2017    GFRESTBLACK 79 12/18/2017    JACEK 9.5 12/18/2017        A1C RESULTS:  No results found for: A1C    INR RESULTS:  Lab Results   Component Value Date    INR 0.92 10/24/2017           DARRYL Higginbotham PA-C  [id number]  [place] [id number]      "

## 2017-12-18 NOTE — PATIENT INSTRUCTIONS
Thanks for coming into [facility] today.    We discussed: Ok to return to soccer.  Continue exercising, and eating healthy.      Talk to Dr. Rosen about Jardiance for your diabetes.      Medication changes:  Ok to stop taking ranitidine.    Continue other medications.      Results:   Component      Latest Ref Rng & Units 12/18/2017   Sodium      136 - 145 mmol/L 140   Potassium      3.5 - 5.1 mmol/L 4.1   Chloride      98 - 107 mmol/L 104   Carbon Dioxide      23 - 29 mmol/L 31 (H)   Anion Gap      6 - 17 mmol/L 9.1   Glucose      70 - 105 mg/dL 106 (H)   Urea Nitrogen      7 - 30 mg/dL 18   Creatinine      0.70 - 1.30 mg/dL 1.18   GFR Estimate      >60 mL/min/1.7m2 66   GFR Estimate If Black      >60 mL/min/1.7m2 79   Calcium      8.5 - 10.5 mg/dL 9.5     Follow up: with Dr. Farnsworth in about 2-3 months.       Please call my nurse at [phone] with any questions or concerns.    Scheduling phone number: [phone]  Reminder: Please bring in all current medications, over the counter supplements and vitamin bottles to your next appointment.

## 2017-12-18 NOTE — LETTER
12/18/2017      Peng Rosen MD  [facility] [address] [address] [place] [address]      RE: Sachin Xavier       Dear Colleague,    I had the pleasure of seeing Sachin Xavier in the [facility].    PRIMARY CARDIOLOGIST:  Dr. Farnsworth.      REASON FOR VISIT:  Inferior STEMI followup.      HISTORY OF PRESENT ILLNESS:  Mr. Gianna Xavier is a delightful 47-year-old gentleman with past medical history significant for hypertension, hyperlipidemia and strong family history of coronary artery disease with his father having a 5-vessel CAB in his late 40s.  He presented with substernal chest pain radiating down his left arm and was found to have an inferior STEMI in 10/2017.  He had an acute thrombotic lesion of his RCA and treated with a drug-eluting stent x2 to the proximal to mid RCA through to the distal in overlapping fashion.  He otherwise had a remaining 50% LAD lesion of the left main without significant disease and a circ without significant disease.  His troponins peaked at 18.  There was concern for clot versus trabeculation on his LV-gram, and he went on to have a cardiac MRI.  This showed a normal EF with moderate hypokinesis in the inferior wall with predominantly viability in the inferior wall.  He had a scar burden of only about 3%.  He had no thrombus visualized.      When I initially saw him in followup, there had been some changes in his medications.  He was switched from Brilinta to Plavix and had taken a double dose on accident.  He has also seen for neck pain in the [facility], and this was musculoskeletal.  He also had some T-wave inversion during cardiac rehab but was asymptomatic.      Since then he has been doing cardiac rehab every day.  He feels fantastic.  He denies chest pain, shortness of breath, orthopnea or PND.  He has some shoulder pain, but this is after lifting weights.  He no longer feels bloated, and he feels  like he is back to himself.  He is wondering if he really has diabetes of not, and of note, his hemoglobin A1c was 7.0 when he was seen by his primary this fall.  At this point, he is on diet and exercise only.  He tells me that as a young man he had hypoglycemic episodes.  They would follow his blood sugar and it would drop low and he would have to eat some sugar even though this is with him not having diabetes or being on any medications.  He said that just resolved, but he felt low this morning at cardiac rehab.  He did not get his blood sugar checked.      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  Prior to admission, he was working about 13-hour days 6-7 days a week, averaging about 30,000 steps a day.  He did this for 1 year.  He is now working less than 50 hours a week.  He grew up in [place], moved here when he was 28.  He comes in today with his wife, Elana, she works in human rights.  They have 2 children in their 20s.      PHYSICAL EXAMINATION:   VITAL SIGNS:  Blood pressure 124/84, pulse 70.    HEENT:  Normocephalic, atraumatic.   GENERAL:  Well-developed, in no acute distress.   HEART:  Regular without murmur, rub or gallop.   LUNGS:  Clear with good airflow deep bilateral lobes.  Carotids are without bruit.   EXTREMITIES:  Without peripheral edema.   SKIN:  Warm and dry.      ASSESSMENT AND PLAN:   1.  Coronary artery disease with inferior STEMI with drug-eluting stent to the proximal to mid RCA.  He has remaining 50% lesion remaining in his LAD and very distal RCA at 20%.  He had only a very small scar burden on his MRI, so I expect this to improve.  At this point, he has absolutely no anginal symptoms and he has been doing excellent workouts at cardiac rehab, including running and doing up to over 9 METs.  He has asked if he can return to soccer where he plays [employer], and I said this would be reasonable.  He is otherwise on appropriate medications.   These were sent to [facility].   2.  Hypertension, well controlled.   3.  Dyslipidemia, on Lipitor 80, was on Lipitor 40 when he came in with his LDL of 98.  We will repeat prior to next clinic visit, may need Crestor.   4.  Type 2 diabetes with a hemoglobin A1c of 7.0.  His blood sugar today is 106, and this is a nonfasting lab.  I did mention to him that Jardiance would be an excellent medication for him, as it would also reduce his cardiac risk.      Thank you for allowing me to participate in this delightful patient's care.  He will follow up with Dr. Farnsworth in about 2-3 months, sooner with concerns.     Outpatient Encounter Prescriptions as of 12/18/2017   Medication Sig Dispense Refill     clopidogrel (PLAVIX) 75 MG tablet Take 1 tablet (75 mg) by mouth daily 90 tablet 3     nitroGLYcerin (NITROSTAT) 0.4 MG sublingual tablet For chest pain place 1 tablet under the tongue every 5 minutes for 3 doses. If symptoms persist 5 minutes after 1st dose call 911. 25 tablet 3     atorvastatin (LIPITOR) 80 MG tablet Take 1 tablet (80 mg) by mouth daily 90 tablet 3     carvedilol (COREG) 6.25 MG tablet Take 1 tablet (6.25 mg) by mouth 2 times daily 180 tablet 3     lisinopril (PRINIVIL/ZESTRIL) 10 MG tablet Take 1 tablet (10 mg) by mouth 2 times daily 180 tablet 3     aspirin EC 81 MG EC tablet Take 1 tablet (81 mg) by mouth daily       [DISCONTINUED] clopidogrel (PLAVIX) 75 MG tablet Take 1 tablet (75 mg) by mouth daily 90 tablet 3     [DISCONTINUED] nitroGLYcerin (NITROSTAT) 0.4 MG sublingual tablet For chest pain place 1 tablet under the tongue every 5 minutes for 3 doses. If symptoms persist 5 minutes after 1st dose call 911. 25 tablet 3     [DISCONTINUED] atorvastatin (LIPITOR) 80 MG tablet Take 1 tablet (80 mg) by mouth daily 90 tablet 3     [DISCONTINUED] carvedilol (COREG) 6.25 MG tablet Take 1 tablet (6.25 mg) by mouth 2 times daily 180 tablet 3     [DISCONTINUED] ranitidine (ZANTAC) 150 MG tablet Take 1 tablet  (150 mg) by mouth every 12 hours 60 tablet 3     [DISCONTINUED] lisinopril (PRINIVIL/ZESTRIL) 10 MG tablet Take 1 tablet (10 mg) by mouth 2 times daily 180 tablet 3     No facility-administered encounter medications on file as of 12/18/2017.        Again, thank you for allowing me to participate in the care of your patient.      Sincerely,    Esperanza Higginbotham PA-C     [facility]

## 2017-12-19 LAB
CHOLEST SERPL-MCNC: 135 MG/DL
HDLC SERPL-MCNC: 33 MG/DL
LDLC SERPL CALC-MCNC: 53 MG/DL
NONHDLC SERPL-MCNC: 102 MG/DL
TRIGL SERPL-MCNC: 243 MG/DL

## 2017-12-19 NOTE — PROGRESS NOTES
PRIMARY CARDIOLOGIST:  Dr. Farnsworth.      REASON FOR VISIT:  Inferior STEMI followup.      HISTORY OF PRESENT ILLNESS:  Mr. Gianna Xavier is a delightful 47-year-old gentleman with past medical history significant for hypertension, hyperlipidemia and strong family history of coronary artery disease with his father having a 5-vessel CAB in his late 40s.  He presented with substernal chest pain radiating down his left arm and was found to have an inferior STEMI in 10/2017.  He had an acute thrombotic lesion of his RCA and treated with a drug-eluting stent x2 to the proximal to mid RCA through to the distal in overlapping fashion.  He otherwise had a remaining 50% LAD lesion of the left main without significant disease and a circ without significant disease.  His troponins peaked at 18.  There was concern for clot versus trabeculation on his LV-gram, and he went on to have a cardiac MRI.  This showed a normal EF with moderate hypokinesis in the inferior wall with predominantly viability in the inferior wall.  He had a scar burden of only about 3%.  He had no thrombus visualized.      When I initially saw him in followup, there had been some changes in his medications.  He was switched from Brilinta to Plavix and had taken a double dose on accident.  He has also seen for neck pain in the [facility], and this was musculoskeletal.  He also had some T-wave inversion during cardiac rehab but was asymptomatic.      Since then he has been doing cardiac rehab every day.  He feels fantastic.  He denies chest pain, shortness of breath, orthopnea or PND.  He has some shoulder pain, but this is after lifting weights.  He no longer feels bloated, and he feels like he is back to himself.  He is wondering if he really has diabetes of not, and of note, his hemoglobin A1c was 7.0 when he was seen by his primary this fall.  At this point, he is on diet and exercise only.  He tells me that as a young man he had hypoglycemic  episodes.  They would follow his blood sugar and it would drop low and he would have to eat some sugar even though this is with him not having diabetes or being on any medications.  He said that just resolved, but he felt low this morning at cardiac rehab.  He did not get his blood sugar checked.      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  Prior to admission, he was working about 13-hour days 6-7 days a week, averaging about 30,000 steps a day.  He did this for 1 year.  He is now working less than 50 hours a week.  He grew up in [place], moved here when he was 28.  He comes in today with his wife, Elana, she works in human rights.  They have 2 children in their 20s.      PHYSICAL EXAMINATION:   VITAL SIGNS:  Blood pressure 124/84, pulse 70.    HEENT:  Normocephalic, atraumatic.   GENERAL:  Well-developed, in no acute distress.   HEART:  Regular without murmur, rub or gallop.   LUNGS:  Clear with good airflow deep bilateral lobes.  Carotids are without bruit.   EXTREMITIES:  Without peripheral edema.   SKIN:  Warm and dry.      ASSESSMENT AND PLAN:   1.  Coronary artery disease with inferior STEMI with drug-eluting stent to the proximal to mid RCA.  He has remaining 50% lesion remaining in his LAD and very distal RCA at 20%.  He had only a very small scar burden on his MRI, so I expect this to improve.  At this point, he has absolutely no anginal symptoms and he has been doing excellent workouts at cardiac rehab, including running and doing up to over 9 METs.  He has asked if he can return to soccer where he plays [employer], and I said this would be reasonable.  He is otherwise on appropriate medications.  These were sent to [facility].   2.  Hypertension, well controlled.   3.  Dyslipidemia, on Lipitor 80, was on Lipitor 40 when he came in with his LDL of 98.  We will repeat prior to next clinic visit, may need Crestor.   4.  Type 2 diabetes with a hemoglobin  A1c of 7.0.  His blood sugar today is 106, and this is a nonfasting lab.  I did mention to him that Jardiance would be an excellent medication for him, as it would also reduce his cardiac risk.      Thank you for allowing me to participate in this delightful patient's care.  He will follow up with Dr. Farnsworth in about 2-3 months, sooner with concerns.      JAROCHO Bee PA-C             D: 2017 17:40   T: 2017 22:41   MT: GINO      Name:     JOSE GODINEZ   MRN:      [id number]        Account:      [id number]   :      1970           Service Date: 2017      Document: [id number]

## 2017-12-20 NOTE — PROGRESS NOTES
Reviewed during clinic visit.  Please see progress note for plan.  Esperanza Higginbotham PA-C 12/20/2017 10:45 AM

## 2018-03-05 NOTE — PROGRESS NOTES
Cardiac Rehab Discharge Summary    Reason for discharge:    PT attended 34 sessions of cardiac rehab but did not return for formal d/c     Progress towards goals:  Goals partially met.  Barriers to achieving goals:   PT did not return for final discharge appt.    Recommendation(s):    Continue home exercise program.     Physician cosignature/electronic signature indicates agreements with the ITP document and approval of discharge.

## 2018-03-06 PROBLEM — E78.2 MIXED HYPERLIPIDEMIA: Status: ACTIVE | Noted: 2018-03-06

## 2018-03-06 PROBLEM — E11.9 DIABETES MELLITUS, TYPE 2 (H): Status: ACTIVE | Noted: 2018-03-06

## 2018-03-06 PROBLEM — I10 HTN (HYPERTENSION): Status: ACTIVE | Noted: 2018-03-06

## 2018-03-06 PROBLEM — I25.10 CAD (CORONARY ARTERY DISEASE): Status: ACTIVE | Noted: 2018-03-06

## 2018-03-08 ENCOUNTER — OFFICE VISIT (OUTPATIENT)
Dept: CARDIOLOGY | Facility: CLINIC | Age: 48
End: 2018-03-08
Attending: PHYSICIAN ASSISTANT
Payer: COMMERCIAL

## 2018-03-08 VITALS
DIASTOLIC BLOOD PRESSURE: 90 MMHG | WEIGHT: 214 LBS | SYSTOLIC BLOOD PRESSURE: 170 MMHG | BODY MASS INDEX: 32.43 KG/M2 | OXYGEN SATURATION: 98 % | HEIGHT: 68 IN | HEART RATE: 61 BPM

## 2018-03-08 DIAGNOSIS — I21.11 ST ELEVATION MYOCARDIAL INFARCTION INVOLVING RIGHT CORONARY ARTERY (H): ICD-10-CM

## 2018-03-08 DIAGNOSIS — E78.2 MIXED HYPERLIPIDEMIA: Primary | ICD-10-CM

## 2018-03-08 DIAGNOSIS — I10 ESSENTIAL HYPERTENSION: ICD-10-CM

## 2018-03-08 DIAGNOSIS — I21.11 STEMI INVOLVING RIGHT CORONARY ARTERY (H): ICD-10-CM

## 2018-03-08 LAB
ALT SERPL W P-5'-P-CCNC: 17 U/L (ref 5–30)
CHOLEST SERPL-MCNC: 130 MG/DL
HDLC SERPL-MCNC: 35 MG/DL
LDLC SERPL CALC-MCNC: 63 MG/DL
NONHDLC SERPL-MCNC: 95 MG/DL
TRIGL SERPL-MCNC: 159 MG/DL

## 2018-03-08 PROCEDURE — 99214 OFFICE O/P EST MOD 30 MIN: CPT | Performed by: NURSE PRACTITIONER

## 2018-03-08 PROCEDURE — 84460 ALANINE AMINO (ALT) (SGPT): CPT | Performed by: PHYSICIAN ASSISTANT

## 2018-03-08 PROCEDURE — 80061 LIPID PANEL: CPT | Performed by: PHYSICIAN ASSISTANT

## 2018-03-08 PROCEDURE — 36415 COLL VENOUS BLD VENIPUNCTURE: CPT | Performed by: PHYSICIAN ASSISTANT

## 2018-03-08 PROCEDURE — 93000 ELECTROCARDIOGRAM COMPLETE: CPT | Performed by: NURSE PRACTITIONER

## 2018-03-08 RX ORDER — LISINOPRIL 10 MG/1
20 TABLET ORAL 2 TIMES DAILY
Qty: 180 TABLET | Refills: 3 | Status: SHIPPED | OUTPATIENT
Start: 2018-03-08 | End: 2018-03-15 | Stop reason: DRUGHIGH

## 2018-03-08 NOTE — LETTER
"3/8/2018    Peng Rosen MD  [facility] [address] [place] [address]    RE: Sachin Xavier       Dear Colleague,    I had the pleasure of seeing Sachin Xavier in the [facility].    Cardiology Clinic Progress Note  Sachin Xavier MRN# [id number]   YOB: 1970 Age: 48 year old     Reason For Visit:  Routine follow up   Primary Cardiologist:   Dr. Farnsworth          History of Presenting Illness:    Sachin Xavier is a pleasant 48 year old patient of Dr. Farnsworth. . He carries a  history significant for an inferior STEMI in 10/2017, he was found to have 100% occlusion of the RCA with subsequent drug-eluting stent ×2 . Mild/ mod LAD disease (50%), LV function was well preserved.  In addition , he has a strong family history of coronary disease, hypertension, and hyperlipidemia. Pt presents today, accompanied by his wife, for routine follow-up.    Over the last 3 weeks, he reports \"flu-like\" symptoms productive cough, and fatigue.  He recently traveled to [place] and unsure if he caught something on the flight. He has remained afebrile treating with hydration and rest. He does admit his symptoms have improved since the initial presentation but is still not completely recovered. He has not followed with his PCP.  He also reports atypical chest discomfort which he describes as a mid sternal ache, radiating to bilateral elbows and tenderness in his left axilla. He has noticed symptoms are more noticeable when he gets up in th morning and after coughing.  He denies shortness of breath, palpitations, orthopnea, or lower extremity edema.         Blood pressure is elevated today with initial reading 158/98, followed by a second reading of 170/90. He does not monitor blood pressures at home.     EKG today confirms sinus boogie with HR of 55, no acute changes.     He works out multiple times a week on an elliptical and " participates in soccer games 2 times per week. .  Follow a heart healthy diet. He works as a  and is very strict about his salt intake. He expresses concern regarding the previous diabetic diagnosis, hoping diet and exercise will be reflective in his A1c.  He is requesting an A1c be added to today's labs.  He was advised to follow up with his PCP for further evaluation.     Compliant with all medications. Denies any bleeding on DAPT.                       Assessment and Plan:      Essential hypertension  Comment: Elevated today  - Increase lisinopril to 20mg BID  - Monitor BP at home daily.   - Goal BP < 130/90  - Follow up BMP in 1 week  - Avoid salt  - Avoid stimulants in OTC cold medications.        ST elevation myocardial infarction involving right coronary artery (H)  Comment:   - Atypical chest pain, musculoskeletal, continue to monitor for worsening symptoms.   - S/p CHELLE x 2 (10/2017)  - Continue on DAPT (Plavix minimum of 1 year), Aspirin ( Lifelong)  - Continue on Coreg, lisinopril  - EKG confirms no acute changes        Mixed hyperlipidemia  (primary encounter diagnosis)  Comment: LDL at goal   Plan: Continue on lipitor.   - Encourage heart healthy diet.          Thank you for allowing me to participate in this delightful patient's care. He will follow up in 1 week for BMP and JOHN visit for BP evaluation.     This note was completed in part using Dragon voice recognition software. Although reviewed after completion, some word and grammatical errors may occur.    LISA Carlin, CNP          Review of Systems:   Review of Systems:  Skin:  Negative     Eyes:  Positive for glasses  ENT:  Negative    Respiratory:  Positive for shortness of breath;cough;mucoid expectorant  Cardiovascular:  Negative Positive for;chest pain;fatigue  Gastroenterology: Positive for excessive gas or bloating  Genitourinary:  Negative    Musculoskeletal:  Positive for neck pain;joint pain  Neurologic:  Negative   "  Psychiatric:  Negative    Heme/Lymph/Imm:  Negative    Endocrine:  Negative                Physical Exam:     Vitals: /90  Pulse 61  Ht 1.727 m (5' 7.99\")  Wt 97.1 kg (214 lb)  SpO2 98%  BMI 32.55 kg/m2  Constitutional:  cooperative, alert and oriented, well developed, well nourished, in no acute distress        Skin:  warm and dry to the touch, no apparent skin lesions or masses noted        Head:  normocephalic, no masses or lesions        Eyes:  pupils equal and round, conjunctivae and lids unremarkable, sclera white, no xanthalasma, EOMS intact, no nystagmus        ENT:      Tender sinuses    Neck:  carotid pulses are full and equal bilaterally, JVP normal, no carotid bruit        Chest:  normal breath sounds, clear to auscultation, normal A-P diameter, normal symmetry, normal respiratory excursion, no use of accessory muscles        Cardiac: regular rhythm, normal S1/S2, no S3 or S4, apical impulse not displaced, no murmurs, gallops or rubs                  Abdomen:  abdomen soft, non-tender, BS normoactive, no mass, no HSM, no bruits        Vascular:                                        Extremities and Back:  no edema   Bilateral elbow pain, Left axillilary tenderness.     Neurological:  no gross motor deficits                 Medications:     Current Outpatient Prescriptions   Medication Sig Dispense Refill     lisinopril (PRINIVIL/ZESTRIL) 10 MG tablet Take 2 tablets (20 mg) by mouth 2 times daily 180 tablet 3     clopidogrel (PLAVIX) 75 MG tablet Take 1 tablet (75 mg) by mouth daily 90 tablet 3     atorvastatin (LIPITOR) 80 MG tablet Take 1 tablet (80 mg) by mouth daily 90 tablet 3     carvedilol (COREG) 6.25 MG tablet Take 1 tablet (6.25 mg) by mouth 2 times daily 180 tablet 3     aspirin EC 81 MG EC tablet Take 1 tablet (81 mg) by mouth daily       nitroGLYcerin (NITROSTAT) 0.4 MG sublingual tablet For chest pain place 1 tablet under the tongue every 5 minutes for 3 doses. If symptoms " persist 5 minutes after 1st dose call 911. 25 tablet 3     [DISCONTINUED] lisinopril (PRINIVIL/ZESTRIL) 10 MG tablet Take 1 tablet (10 mg) by mouth 2 times daily 180 tablet 3           Family History   Problem Relation Age of Onset     Coronary Artery Disease Father      Brain Tumor Father      DIABETES Father      Coronary Artery Disease Paternal Grandmother      Coronary Artery Disease Paternal Grandfather      Parkinsonism Mother      Family History Negative Maternal Grandmother      Family History Negative Maternal Grandfather      Family History Negative Brother      Family History Negative Son      Family History Negative Daughter        Social History     Social History     Marital status:      Spouse name: N/A     Number of children: N/A     Years of education: N/A     Occupational History     Not on file.     Social History Main Topics     Smoking status: Never Smoker     Smokeless tobacco: Never Used     Alcohol use Yes      Comment: rare     Drug use: No     Sexual activity: Not on file     Other Topics Concern     Not on file     Social History Narrative            Past Medical History:     Past Medical History:   Diagnosis Date     Hyperlipidemia      Hypertension      STEMI (ST elevation myocardial infarction) (H)     thrombotitic lesion of RCA -CHELLE               Past Surgical History:     Past Surgical History:   Procedure Laterality Date     HERNIA REPAIR       ORTHOPEDIC SURGERY      acl repair              Allergies:   Review of patient's allergies indicates no known allergies.       Data:   All laboratory data reviewed:    LAST CHOLESTEROL:  Lab Results   Component Value Date    CHOL 130 03/08/2018     Lab Results   Component Value Date    HDL 35 03/08/2018     Lab Results   Component Value Date    LDL 63 03/08/2018     Lab Results   Component Value Date    TRIG 159 03/08/2018     No results found for: CHOLHDLRATIO    LAST BMP:  Lab Results   Component Value Date     12/18/2017       Lab Results   Component Value Date    POTASSIUM 4.1 12/18/2017     Lab Results   Component Value Date    CHLORIDE 104 12/18/2017     Lab Results   Component Value Date    JACEK 9.5 12/18/2017     Lab Results   Component Value Date    CO2 31 12/18/2017     Lab Results   Component Value Date    BUN 18 12/18/2017     Lab Results   Component Value Date    CR 1.18 12/18/2017     Lab Results   Component Value Date     12/18/2017       LAST CBC:  Lab Results   Component Value Date    WBC 10.9 10/25/2017     Lab Results   Component Value Date    RBC 4.93 10/25/2017     Lab Results   Component Value Date    HGB 14.6 10/25/2017     Lab Results   Component Value Date    HCT 41.8 10/25/2017     Lab Results   Component Value Date    MCV 85 10/25/2017     Lab Results   Component Value Date    MCH 29.6 10/25/2017     Lab Results   Component Value Date    MCHC 34.9 10/25/2017     Lab Results   Component Value Date    RDW 13.5 10/25/2017     Lab Results   Component Value Date     10/25/2017       Thank you for allowing me to participate in the care of your patient.    Sincerely,     LISA Carlin [facility]

## 2018-03-08 NOTE — PATIENT INSTRUCTIONS
Thanks for coming into [facility] clinic today.    We discussed: Heart disease, elevated blood pressures, episodes of atypical chest pain.     Medication changes:  Increase Lisinopril to 20mg twice daily.     Results: EKG results.     Follow up: Follow up in 1 week with BMP (lab), Follow up in 1 week with JOHN for BP recheck.      Please call my nurse at [phone] with any questions or concerns.    Scheduling phone number: [phone]  Reminder: Please bring in all current medications, over the counter supplements and vitamin bottles to your next appointment.

## 2018-03-08 NOTE — PROGRESS NOTES
"Cardiology Clinic Progress Note  Sachin Xavier MRN# [id number]   YOB: 1970 Age: 48 year old     Reason For Visit:  Routine follow up   Primary Cardiologist:   Dr. Farnsworth          History of Presenting Illness:    Sachin Xavier is a pleasant 48 year old patient of Dr. Farnsworth. . He carries a  history significant for an inferior STEMI in 10/2017, he was found to have 100% occlusion of the RCA with subsequent drug-eluting stent ×2. Mild/ mod LAD disease (50%), LV function was well preserved.  In addition , he has a strong family history of coronary disease, hypertension, and hyperlipidemia. Pt presents today, accompanied by his wife, for routine follow-up.    Over the last 3 weeks, he reports \"flu-like\" symptoms productive cough, and fatigue.  He recently traveled to [place] and unsure if he caught something on the flight. He has remained afebrile treating with hydration and rest. He does admit his symptoms have improved since the initial presentation but is still not completely recovered. He has not followed with his PCP.  He also reports atypical chest discomfort which he describes as a mid sternal ache, radiating to bilateral elbows and tenderness in his left axilla. He has noticed symptoms are more noticeable when he gets up in th morning and after coughing.  He denies shortness of breath, palpitations, orthopnea, or lower extremity edema.         Blood pressure is elevated today with initial reading 158/98, followed by a second reading of 170/90. He does not monitor blood pressures at home.     EKG today confirms sinus boogie with HR of 55, no acute changes.     He works out multiple times a week on an [employer] and participates in soccer games 2 times per week. .  Follow a heart healthy diet. He works as a  and is very strict about his salt intake. He expresses concern regarding the previous diabetic diagnosis, hoping diet and exercise will be reflective in his A1c.  He is " "requesting an A1c be added to today's labs.  He was advised to follow up with his PCP for further evaluation.     Compliant with all medications. Denies any bleeding on DAPT.                       Assessment and Plan:      Essential hypertension  Comment: Elevated today  - Increase lisinopril to 20mg BID  - Monitor BP at home daily.   - Goal BP < 130/90  - Follow up BMP in 1 week  - Avoid salt  - Avoid stimulants in OTC cold medications.        ST elevation myocardial infarction involving right coronary artery (H)  Comment:   - Atypical chest pain, musculoskeletal, continue to monitor for worsening symptoms.   - S/p CHELLE x 2 (10/2017)  - Continue on DAPT (Plavix minimum of 1 year), Aspirin ( Lifelong)  - Continue on Coreg, lisinopril  - EKG confirms no acute changes        Mixed hyperlipidemia  (primary encounter diagnosis)  Comment: LDL at goal   Plan: Continue on lipitor.   - Encourage heart healthy diet.          Thank you for allowing me to participate in this delightful patient's care. He will follow up in 1 week for BMP and JOHN visit for BP evaluation.     This note was completed in part using Dragon voice recognition software. Although reviewed after completion, some word and grammatical errors may occur.    LISA Carlin, CNP          Review of Systems:   Review of Systems:  Skin:  Negative     Eyes:  Positive for glasses  ENT:  Negative    Respiratory:  Positive for shortness of breath;cough;mucoid expectorant  Cardiovascular:  Negative Positive for;chest pain;fatigue  Gastroenterology: Positive for excessive gas or bloating  Genitourinary:  Negative    Musculoskeletal:  Positive for neck pain;joint pain  Neurologic:  Negative    Psychiatric:  Negative    Heme/Lymph/Imm:  Negative    Endocrine:  Negative                Physical Exam:     Vitals: /90  Pulse 61  Ht 1.727 m (5' 7.99\")  Wt 97.1 kg (214 lb)  SpO2 98%  BMI 32.55 kg/m2  Constitutional:  cooperative, alert and oriented, well " developed, well nourished, in no acute distress        Skin:  warm and dry to the touch, no apparent skin lesions or masses noted        Head:  normocephalic, no masses or lesions        Eyes:  pupils equal and round, conjunctivae and lids unremarkable, sclera white, no xanthalasma, EOMS intact, no nystagmus        ENT:      Tender sinuses    Neck:  carotid pulses are full and equal bilaterally, JVP normal, no carotid bruit        Chest:  normal breath sounds, clear to auscultation, normal A-P diameter, normal symmetry, normal respiratory excursion, no use of accessory muscles        Cardiac: regular rhythm, normal S1/S2, no S3 or S4, apical impulse not displaced, no murmurs, gallops or rubs                  Abdomen:  abdomen soft, non-tender, BS normoactive, no mass, no HSM, no bruits        Vascular:                                        Extremities and Back:  no edema   Bilateral elbow pain, Left axillilary tenderness.     Neurological:  no gross motor deficits                 Medications:     Current Outpatient Prescriptions   Medication Sig Dispense Refill     lisinopril (PRINIVIL/ZESTRIL) 10 MG tablet Take 2 tablets (20 mg) by mouth 2 times daily 180 tablet 3     clopidogrel (PLAVIX) 75 MG tablet Take 1 tablet (75 mg) by mouth daily 90 tablet 3     atorvastatin (LIPITOR) 80 MG tablet Take 1 tablet (80 mg) by mouth daily 90 tablet 3     carvedilol (COREG) 6.25 MG tablet Take 1 tablet (6.25 mg) by mouth 2 times daily 180 tablet 3     aspirin EC 81 MG EC tablet Take 1 tablet (81 mg) by mouth daily       nitroGLYcerin (NITROSTAT) 0.4 MG sublingual tablet For chest pain place 1 tablet under the tongue every 5 minutes for 3 doses. If symptoms persist 5 minutes after 1st dose call 911. 25 tablet 3     [DISCONTINUED] lisinopril (PRINIVIL/ZESTRIL) 10 MG tablet Take 1 tablet (10 mg) by mouth 2 times daily 180 tablet 3           Family History   Problem Relation Age of Onset     Coronary Artery Disease Father      Brain  Tumor Father      DIABETES Father      Coronary Artery Disease Paternal Grandmother      Coronary Artery Disease Paternal Grandfather      Parkinsonism Mother      Family History Negative Maternal Grandmother      Family History Negative Maternal Grandfather      Family History Negative Brother      Family History Negative Son      Family History Negative Daughter        Social History     Social History     Marital status:      Spouse name: N/A     Number of children: N/A     Years of education: N/A     Occupational History     Not on file.     Social History Main Topics     Smoking status: Never Smoker     Smokeless tobacco: Never Used     Alcohol use Yes      Comment: rare     Drug use: No     Sexual activity: Not on file     Other Topics Concern     Not on file     Social History Narrative            Past Medical History:     Past Medical History:   Diagnosis Date     Hyperlipidemia      Hypertension      STEMI (ST elevation myocardial infarction) (H)     thrombotitic lesion of RCA -CHELLE               Past Surgical History:     Past Surgical History:   Procedure Laterality Date     HERNIA REPAIR       ORTHOPEDIC SURGERY      acl repair              Allergies:   Review of patient's allergies indicates no known allergies.       Data:   All laboratory data reviewed:    LAST CHOLESTEROL:  Lab Results   Component Value Date    CHOL 130 03/08/2018     Lab Results   Component Value Date    HDL 35 03/08/2018     Lab Results   Component Value Date    LDL 63 03/08/2018     Lab Results   Component Value Date    TRIG 159 03/08/2018     No results found for: CHOLHDLRATIO    LAST BMP:  Lab Results   Component Value Date     12/18/2017      Lab Results   Component Value Date    POTASSIUM 4.1 12/18/2017     Lab Results   Component Value Date    CHLORIDE 104 12/18/2017     Lab Results   Component Value Date    JACEK 9.5 12/18/2017     Lab Results   Component Value Date    CO2 31 12/18/2017     Lab Results    Component Value Date    BUN 18 12/18/2017     Lab Results   Component Value Date    CR 1.18 12/18/2017     Lab Results   Component Value Date     12/18/2017       LAST CBC:  Lab Results   Component Value Date    WBC 10.9 10/25/2017     Lab Results   Component Value Date    RBC 4.93 10/25/2017     Lab Results   Component Value Date    HGB 14.6 10/25/2017     Lab Results   Component Value Date    HCT 41.8 10/25/2017     Lab Results   Component Value Date    MCV 85 10/25/2017     Lab Results   Component Value Date    MCH 29.6 10/25/2017     Lab Results   Component Value Date    MCHC 34.9 10/25/2017     Lab Results   Component Value Date    RDW 13.5 10/25/2017     Lab Results   Component Value Date     10/25/2017         LISA Carlin, CNP

## 2018-03-08 NOTE — MR AVS SNAPSHOT
After Visit Summary   3/8/2018    Sachin Xavier    MRN: [id number]           Patient Information     Date Of Birth          1970        Visit Information        Provider Department      3/8/2018 10:10 AM Melania Benoit APRN CNP [facility]   [place]        Today's Diagnoses     Mixed hyperlipidemia    -  1    STEMI involving right coronary artery (H)        Essential hypertension        ST elevation myocardial infarction involving right coronary artery (H)          Care Instructions    Thanks for coming into [facility] today.    We discussed: Heart disease, elevated blood pressures, episodes of atypical chest pain.     Medication changes:  Increase Lisinopril to 20mg twice daily.     Results: EKG results.     Follow up: Follow up in 1 week with BMP (lab), Follow up in 1 week with JOHN for BP recheck.      Please call my nurse at [phone] with any questions or concerns.    Scheduling phone number: [phone]  Reminder: Please bring in all current medications, over the counter supplements and vitamin bottles to your next appointment.                Follow-ups after your visit        Additional Services     Follow-Up with Cardiac Advanced Practice Provider                 Future tests that were ordered for you today     Open Future Orders        Priority Expected Expires Ordered    Follow-Up with Cardiac Advanced Practice Provider Routine 3/15/2018 3/8/2019 3/8/2018    Hemoglobin A1c Routine 3/8/2018 3/8/2019 3/8/2018    Basic metabolic panel Routine 3/15/2018 3/8/2019 3/8/2018            Who to contact     If you have questions or need follow up information about today's clinic visit or your schedule please contact [facility] directly at [phone].  Normal or non-critical lab and imaging results will be communicated to you by MyChart, letter or phone within 4 business days after the clinic  "has received the results. If you do not hear from us within 7 days, please contact the clinic through [employer] or phone. If you have a critical or abnormal lab result, we will notify you by phone as soon as possible.  Submit refill requests through [employer] or call your pharmacy and they will forward the refill request to us. Please allow 3 business days for your refill to be completed.          Additional Information About Your Visit        [employer] Information     [employer] lets you send messages to your doctor, view your test results, renew your prescriptions, schedule appointments and more. To sign up, go to [email or web address] . Click on \"Log in\" on the left side of the screen, which will take you to the Welcome page. Then click on \"Sign up Now\" on the right side of the page.     You will be asked to enter the access code listed below, as well as some personal information. Please follow the directions to create your username and password.     Your access code is: [email or web address]  Expires: 2018 10:29 AM     Your access code will  in 90 days. If you need help or a new code, please call your [facility] clinic or [phone].        Care EveryWhere ID     This is your Care EveryWhere ID. This could be used by other organizations to access your [facility] medical records  [id number]        Your Vitals Were     Pulse Height Pulse Oximetry BMI (Body Mass Index)          61 1.727 m (5' 7.99\") 98% 32.55 kg/m2         Blood Pressure from Last 3 Encounters:   18 170/90   17 124/84   17 128/84    Weight from Last 3 Encounters:   18 97.1 kg (214 lb)   17 97.3 kg (214 lb 6.4 oz)   17 96.7 kg (213 lb 3.2 oz)              We Performed the Following     EKG 12-lead complete w/read - Clinics     Follow-Up with Cardiologist          Today's Medication Changes          These changes are accurate as of 3/8/18 10:34 AM.  If you have any questions, ask your nurse or doctor.               These " medicines have changed or have updated prescriptions.        Dose/Directions    lisinopril 10 MG tablet   Commonly known as:  PRINIVIL/ZESTRIL   This may have changed:  how much to take   Used for:  ST elevation myocardial infarction involving right coronary artery (H)   Changed by:  Melania Benoit APRN CNP        Dose:  20 mg   Take 2 tablets (20 mg) by mouth 2 times daily   Quantity:  180 tablet   Refills:  3            Where to get your medicines      These medications were sent to [facility] [place] [facility] [address]  [address] [address], [place] [address]     Phone:  [phone]     lisinopril 10 MG tablet                Primary Care Provider Office Phone # Fax #    Peng Rosen [phone]       [facility] [place] [facility] [address] [place] [address]        Equal Access to Services     MAGDA WRIGHT : Hadii aad ku hadasho Soomaali, waaxda luqadaha, qaybta kaalmada adeegyada, waxay idiin hayaan ernie nunez . So [facility] [phone].    ATENCIÓN: Si habla español, tiene a pickett disposición servicios gratuitos de asistencia lingüística. Dimitrios al [phone].    We comply with applicable federal civil rights laws and [place] laws. We do not discriminate on the basis of race, color, national origin, age, disability, sex, sexual orientation, or gender identity.            Thank you!     Thank you for choosing [facility]  for your care. Our goal is always to provide you with excellent care. Hearing back from our patients is one way we can continue to improve our services. Please take a few minutes to complete the written survey that you may receive in the mail after your visit with us. Thank you!             Your Updated Medication List - Protect others around you: Learn how to safely use, store and throw away your medicines at www.disposemymeds.org.          This list is accurate  as of 3/8/18 10:34 AM.  Always use your most recent med list.                   Brand Name Dispense Instructions for use Diagnosis    aspirin 81 MG EC tablet      Take 1 tablet (81 mg) by mouth daily    ST elevation myocardial infarction involving right coronary artery (H)       atorvastatin 80 MG tablet    LIPITOR    90 tablet    Take 1 tablet (80 mg) by mouth daily    Hyperlipidemia LDL goal <70       carvedilol 6.25 MG tablet    COREG    180 tablet    Take 1 tablet (6.25 mg) by mouth 2 times daily    ST elevation myocardial infarction involving right coronary artery (H)       clopidogrel 75 MG tablet    PLAVIX    90 tablet    Take 1 tablet (75 mg) by mouth daily    STEMI involving right coronary artery (H)       lisinopril 10 MG tablet    PRINIVIL/ZESTRIL    180 tablet    Take 2 tablets (20 mg) by mouth 2 times daily    ST elevation myocardial infarction involving right coronary artery (H)       nitroGLYcerin 0.4 MG sublingual tablet    NITROSTAT    25 tablet    For chest pain place 1 tablet under the tongue every 5 minutes for 3 doses. If symptoms persist 5 minutes after 1st dose call 911.    ST elevation myocardial infarction involving right coronary artery (H)

## 2018-03-15 ENCOUNTER — OFFICE VISIT (OUTPATIENT)
Dept: CARDIOLOGY | Facility: CLINIC | Age: 48
End: 2018-03-15
Attending: NURSE PRACTITIONER
Payer: COMMERCIAL

## 2018-03-15 VITALS
DIASTOLIC BLOOD PRESSURE: 86 MMHG | HEIGHT: 68 IN | WEIGHT: 213.8 LBS | BODY MASS INDEX: 32.4 KG/M2 | HEART RATE: 65 BPM | SYSTOLIC BLOOD PRESSURE: 134 MMHG

## 2018-03-15 DIAGNOSIS — I10 ESSENTIAL HYPERTENSION: ICD-10-CM

## 2018-03-15 DIAGNOSIS — E78.2 MIXED HYPERLIPIDEMIA: ICD-10-CM

## 2018-03-15 DIAGNOSIS — I21.11 ST ELEVATION MYOCARDIAL INFARCTION INVOLVING RIGHT CORONARY ARTERY (H): ICD-10-CM

## 2018-03-15 DIAGNOSIS — I10 ESSENTIAL HYPERTENSION: Primary | ICD-10-CM

## 2018-03-15 DIAGNOSIS — I21.11 STEMI INVOLVING RIGHT CORONARY ARTERY (H): ICD-10-CM

## 2018-03-15 DIAGNOSIS — E11.8 TYPE 2 DIABETES MELLITUS WITH COMPLICATION, UNSPECIFIED LONG TERM INSULIN USE STATUS: ICD-10-CM

## 2018-03-15 LAB
ANION GAP SERPL CALCULATED.3IONS-SCNC: 12.3 MMOL/L (ref 6–17)
BUN SERPL-MCNC: 18 MG/DL (ref 7–30)
CALCIUM SERPL-MCNC: 9.1 MG/DL (ref 8.5–10.5)
CHLORIDE SERPL-SCNC: 105 MMOL/L (ref 98–107)
CO2 SERPL-SCNC: 27 MMOL/L (ref 23–29)
CREAT SERPL-MCNC: 1.16 MG/DL (ref 0.7–1.3)
GFR SERPL CREATININE-BSD FRML MDRD: 67 ML/MIN/1.7M2
GLUCOSE SERPL-MCNC: 151 MG/DL (ref 70–105)
HBA1C MFR BLD: 7 % (ref 4.3–6)
POTASSIUM SERPL-SCNC: 4.3 MMOL/L (ref 3.5–5.1)
SODIUM SERPL-SCNC: 140 MMOL/L (ref 136–145)

## 2018-03-15 PROCEDURE — 36415 COLL VENOUS BLD VENIPUNCTURE: CPT | Performed by: PHYSICIAN ASSISTANT

## 2018-03-15 PROCEDURE — 83036 HEMOGLOBIN GLYCOSYLATED A1C: CPT | Performed by: PHYSICIAN ASSISTANT

## 2018-03-15 PROCEDURE — 80048 BASIC METABOLIC PNL TOTAL CA: CPT | Performed by: PHYSICIAN ASSISTANT

## 2018-03-15 PROCEDURE — 99213 OFFICE O/P EST LOW 20 MIN: CPT | Performed by: NURSE PRACTITIONER

## 2018-03-15 RX ORDER — LISINOPRIL 20 MG/1
20 TABLET ORAL 2 TIMES DAILY
Qty: 30 TABLET | Refills: 3 | Status: SHIPPED | OUTPATIENT
Start: 2018-03-15 | End: 2020-04-03

## 2018-03-15 NOTE — PROGRESS NOTES
Cardiology Clinic Progress Note  Sachin Xavier MRN# [id number]   YOB: 1970 Age: 48 year old     Reason For Visit:  1 week Follow up blood pressure check   Primary Cardiologist:   Dr. Farnsworth          History of Presenting Illness:    Sachin Xavier is a pleasant 48 year old patient of Dr. Farnsworth.  He carries a  history significant for STEMI status post CHELLE ×2 (10/2017), hypertension,and hyperlipidemia.  He presented to clinic 1 week ago with elevated blood pressures, cough and atypical chest pain.  He reported having flulike symptoms after a recent trip to [place].  His lisinopril was increased to 20 mg twice daily and instructed to monitor his blood pressures daily and follow-up in the clinic in 1 week.  He presents today accompanied by his wife.      He feels well on a cardiac standpoint, denies chest pain, shortness of breath, PND,   orthopnea, presyncope, syncope, edema, heart racing, or palpitations.  He states after increasing the lisinopril he has had a complete resolution of symptoms.  He is able to participate in his normal extracurricular activities of competitive soccer without symptom.     Blood pressure today is well controlled. Home monitoring averages in the 120s over 80s, with an occasional outlier of 140 systolic.  He also states he monitors his blood sugar on a daily basis after a recent diagnosis of diabetes, which is being treated with diet and exercise.  Follow-up A1c today was 7.  He is being recommended for follow-up with his PCP for diabetes management.    He follows a heart healthy diet and watches strict sodium intake.  Compliant with all medications.  No new cardiac complaints.                   Assessment and Plan:      Essential hypertension  Comment: Well controlled  Plan: Continue on current medical therapy  - Monitor BP daily, Goal < 130/90  - Avoid salt  - Encourage exercise.                 Thank you for allowing me to participate in this delightful  "patient's care. Follow up in 6 months to establish care with Dr. Farnsworth.      This note was completed in part using Dragon voice recognition software. Although reviewed after completion, some word and grammatical errors may occur.    LISA Carlin, CNP          Review of Systems:   Review of Systems:  Skin:  Negative     Eyes:  Positive for glasses  ENT:  Negative    Respiratory:  Negative    Cardiovascular:  Negative    Gastroenterology: Negative    Genitourinary:  Negative    Musculoskeletal:  Positive for neck pain;joint pain  Neurologic:  Negative    Psychiatric:  Negative    Heme/Lymph/Imm:  Negative    Endocrine:  Positive for diabetes              Physical Exam:     Vitals: /86  Pulse 65  Ht 1.727 m (5' 7.99\")  Wt 97 kg (213 lb 12.8 oz)  BMI 32.52 kg/m2  Constitutional:  cooperative, alert and oriented, well developed, well nourished, in no acute distress        Skin:  warm and dry to the touch, no apparent skin lesions or masses noted        Head:  normocephalic, no masses or lesions        Eyes:  pupils equal and round, conjunctivae and lids unremarkable, sclera white, no xanthalasma, EOMS intact, no nystagmus        ENT:  no pallor or cyanosis, dentition good        Neck:  carotid pulses are full and equal bilaterally, JVP normal, no carotid bruit        Chest:  normal breath sounds, clear to auscultation, normal A-P diameter, normal symmetry, normal respiratory excursion, no use of accessory muscles        Cardiac: regular rhythm, normal S1/S2, no S3 or S4, apical impulse not displaced, no murmurs, gallops or rubs                  Abdomen:  abdomen soft, non-tender, BS normoactive, no mass, no HSM, no bruits        Vascular:                                        Extremities and Back:  no deformities, clubbing, cyanosis, erythema observed        Neurological:  no gross motor deficits                 Medications:     Current Outpatient Prescriptions   Medication Sig Dispense Refill     " lisinopril (PRINIVIL/ZESTRIL) 10 MG tablet Take 2 tablets (20 mg) by mouth 2 times daily 180 tablet 3     clopidogrel (PLAVIX) 75 MG tablet Take 1 tablet (75 mg) by mouth daily 90 tablet 3     nitroGLYcerin (NITROSTAT) 0.4 MG sublingual tablet For chest pain place 1 tablet under the tongue every 5 minutes for 3 doses. If symptoms persist 5 minutes after 1st dose call 911. 25 tablet 3     atorvastatin (LIPITOR) 80 MG tablet Take 1 tablet (80 mg) by mouth daily 90 tablet 3     carvedilol (COREG) 6.25 MG tablet Take 1 tablet (6.25 mg) by mouth 2 times daily 180 tablet 3     aspirin EC 81 MG EC tablet Take 1 tablet (81 mg) by mouth daily             Family History   Problem Relation Age of Onset     Coronary Artery Disease Father      Brain Tumor Father      DIABETES Father      Coronary Artery Disease Paternal Grandmother      Coronary Artery Disease Paternal Grandfather      Parkinsonism Mother      Family History Negative Maternal Grandmother      Family History Negative Maternal Grandfather      Family History Negative Brother      Family History Negative Son      Family History Negative Daughter        Social History     Social History     Marital status:      Spouse name: N/A     Number of children: N/A     Years of education: N/A     Occupational History     Not on file.     Social History Main Topics     Smoking status: Never Smoker     Smokeless tobacco: Never Used     Alcohol use Yes      Comment: rare     Drug use: No     Sexual activity: Not on file     Other Topics Concern     Not on file     Social History Narrative            Past Medical History:     Past Medical History:   Diagnosis Date     Hyperlipidemia      Hypertension      STEMI (ST elevation myocardial infarction) (H)     thrombotitic lesion of RCA -CHELLE               Past Surgical History:     Past Surgical History:   Procedure Laterality Date     HERNIA REPAIR       ORTHOPEDIC SURGERY      acl repair              Allergies:    Review of patient's allergies indicates no known allergies.       Data:   All laboratory data reviewed:    LAST CHOLESTEROL:  Lab Results   Component Value Date    CHOL 130 03/08/2018     Lab Results   Component Value Date    HDL 35 03/08/2018     Lab Results   Component Value Date    LDL 63 03/08/2018     Lab Results   Component Value Date    TRIG 159 03/08/2018     No results found for: CHOLHDLRATIO    LAST BMP:  Lab Results   Component Value Date     03/15/2018      Lab Results   Component Value Date    POTASSIUM 4.3 03/15/2018     Lab Results   Component Value Date    CHLORIDE 105 03/15/2018     Lab Results   Component Value Date    JACEK 9.1 03/15/2018     Lab Results   Component Value Date    CO2 27 03/15/2018     Lab Results   Component Value Date    BUN 18 03/15/2018     Lab Results   Component Value Date    CR 1.16 03/15/2018     Lab Results   Component Value Date     03/15/2018       LAST CBC:  Lab Results   Component Value Date    WBC 10.9 10/25/2017     Lab Results   Component Value Date    RBC 4.93 10/25/2017     Lab Results   Component Value Date    HGB 14.6 10/25/2017     Lab Results   Component Value Date    HCT 41.8 10/25/2017     Lab Results   Component Value Date    MCV 85 10/25/2017     Lab Results   Component Value Date    MCH 29.6 10/25/2017     Lab Results   Component Value Date    MCHC 34.9 10/25/2017     Lab Results   Component Value Date    RDW 13.5 10/25/2017     Lab Results   Component Value Date     10/25/2017         LISA Carlin, CNP

## 2018-03-15 NOTE — LETTER
3/15/2018    Peng Rosen MD  [facility] [place] [facility] [address] [facility] [place] [address]    RE: Sachin Xavier       Dear Colleague,    I had the pleasure of seeing Sachin Xavier in the [facility].    Cardiology Clinic Progress Note  Sachin Xavier MRN# [id number]   YOB: 1970 Age: 48 year old     Reason For Visit:  1 week Follow up blood pressure check   Primary Cardiologist:   Dr. Farnsworth          History of Presenting Illness:    Sachin Xavier is a pleasant 48 year old patient of Dr. Farnsworth.  He carries a  history significant for STEMI status post CHELLE ×2  (10/2017), hypertension,and hyperlipidemia.  He presented to clinic 1 week ago with elevated blood pressures, cough and atypical chest pain.  He reported having flulike symptoms after a recent trip to [place].  His lisinopril was increased to 20 mg twice daily and instructed to monitor his blood pressures daily and follow-up in the clinic in 1 week.  He presents today accompanied by his wife.      He feels well on a cardiac standpoint, denies chest pain, shortness of breath, PND,   orthopnea, presyncope, syncope, edema, heart racing, or palpitations.  He states after increasing the lisinopril he has had a complete resolution of symptoms.  He is able to participate in his normal extracurricular activities of competitive soccer without symptom.     Blood pressure today is well controlled. Home monitoring averages in the 120s over 80s, with an occasional outlier of 140 systolic.  He also states he monitors his blood sugar on a daily basis after a recent diagnosis of diabetes, which is being treated with diet and exercise.  Follow-up A1c today was 7.  He is being recommended for follow-up with his PCP for diabetes management.    He follows a heart healthy diet and watches strict sodium intake.  Compliant with all medications.  No new cardiac complaints.                    "Assessment and Plan:      Essential hypertension  Comment: Well controlled  Plan: Continue on current medical therapy  - Monitor BP daily, Goal < 130/90  - Avoid salt  - Encourage exercise.                 Thank you for allowing me to participate in this delightful patient's care. Follow up in 6 months to establish care with Dr. Farnsworth.      This note was completed in part using Dragon voice recognition software. Although reviewed after completion, some word and grammatical errors may occur.    Melania Benoit, APRN, CNP          Review of Systems:   Review of Systems:  Skin:  Negative     Eyes:  Positive for glasses  ENT:  Negative    Respiratory:  Negative    Cardiovascular:  Negative    Gastroenterology: Negative    Genitourinary:  Negative    Musculoskeletal:  Positive for neck pain;joint pain  Neurologic:  Negative    Psychiatric:  Negative    Heme/Lymph/Imm:  Negative    Endocrine:  Positive for diabetes              Physical Exam:     Vitals: /86  Pulse 65  Ht 1.727 m (5' 7.99\")  Wt 97 kg (213 lb 12.8 oz)  BMI 32.52 kg/m2  Constitutional:  cooperative, alert and oriented, well developed, well nourished, in no acute distress        Skin:  warm and dry to the touch, no apparent skin lesions or masses noted        Head:  normocephalic, no masses or lesions        Eyes:  pupils equal and round, conjunctivae and lids unremarkable, sclera white, no xanthalasma, EOMS intact, no nystagmus        ENT:  no pallor or cyanosis, dentition good        Neck:  carotid pulses are full and equal bilaterally, JVP normal, no carotid bruit        Chest:  normal breath sounds, clear to auscultation, normal A-P diameter, normal symmetry, normal respiratory excursion, no use of accessory muscles        Cardiac: regular rhythm, normal S1/S2, no S3 or S4, apical impulse not displaced, no murmurs, gallops or rubs                  Abdomen:  abdomen soft, non-tender, BS normoactive, no mass, no HSM, no bruits        Vascular:   "                                      Extremities and Back:  no deformities, clubbing, cyanosis, erythema observed        Neurological:  no gross motor deficits                 Medications:     Current Outpatient Prescriptions   Medication Sig Dispense Refill     lisinopril (PRINIVIL/ZESTRIL) 10 MG tablet Take 2 tablets (20 mg) by mouth 2 times daily 180 tablet 3     clopidogrel (PLAVIX) 75 MG tablet Take 1 tablet (75 mg) by mouth daily 90 tablet 3     nitroGLYcerin (NITROSTAT) 0.4 MG sublingual tablet For chest pain place 1 tablet under the tongue every 5 minutes for 3 doses. If symptoms persist 5 minutes after 1st dose call 911. 25 tablet 3     atorvastatin (LIPITOR) 80 MG tablet Take 1 tablet (80 mg) by mouth daily 90 tablet 3     carvedilol (COREG) 6.25 MG tablet Take 1 tablet (6.25 mg) by mouth 2 times daily 180 tablet 3     aspirin EC 81 MG EC tablet Take 1 tablet (81 mg) by mouth daily             Family History   Problem Relation Age of Onset     Coronary Artery Disease Father      Brain Tumor Father      DIABETES Father      Coronary Artery Disease Paternal Grandmother      Coronary Artery Disease Paternal Grandfather      Parkinsonism Mother      Family History Negative Maternal Grandmother      Family History Negative Maternal Grandfather      Family History Negative Brother      Family History Negative Son      Family History Negative Daughter        Social History     Social History     Marital status:      Spouse name: N/A     Number of children: N/A     Years of education: N/A     Occupational History     Not on file.     Social History Main Topics     Smoking status: Never Smoker     Smokeless tobacco: Never Used     Alcohol use Yes      Comment: rare     Drug use: No     Sexual activity: Not on file     Other Topics Concern     Not on file     Social History Narrative            Past Medical History:     Past Medical History:   Diagnosis Date     Hyperlipidemia      Hypertension      STEMI (ST  elevation myocardial infarction) (H)     thrombotitic lesion of RCA -CHELLE               Past Surgical History:     Past Surgical History:   Procedure Laterality Date     HERNIA REPAIR       ORTHOPEDIC SURGERY      acl repair              Allergies:   Review of patient's allergies indicates no known allergies.       Data:   All laboratory data reviewed:    LAST CHOLESTEROL:  Lab Results   Component Value Date    CHOL 130 03/08/2018     Lab Results   Component Value Date    HDL 35 03/08/2018     Lab Results   Component Value Date    LDL 63 03/08/2018     Lab Results   Component Value Date    TRIG 159 03/08/2018     No results found for: CHOLHDLRATIO    LAST BMP:  Lab Results   Component Value Date     03/15/2018      Lab Results   Component Value Date    POTASSIUM 4.3 03/15/2018     Lab Results   Component Value Date    CHLORIDE 105 03/15/2018     Lab Results   Component Value Date    JACEK 9.1 03/15/2018     Lab Results   Component Value Date    CO2 27 03/15/2018     Lab Results   Component Value Date    BUN 18 03/15/2018     Lab Results   Component Value Date    CR 1.16 03/15/2018     Lab Results   Component Value Date     03/15/2018       LAST CBC:  Lab Results   Component Value Date    WBC 10.9 10/25/2017     Lab Results   Component Value Date    RBC 4.93 10/25/2017     Lab Results   Component Value Date    HGB 14.6 10/25/2017     Lab Results   Component Value Date    HCT 41.8 10/25/2017     Lab Results   Component Value Date    MCV 85 10/25/2017     Lab Results   Component Value Date    MCH 29.6 10/25/2017     Lab Results   Component Value Date    MCHC 34.9 10/25/2017     Lab Results   Component Value Date    RDW 13.5 10/25/2017     Lab Results   Component Value Date     10/25/2017       Thank you for allowing me to participate in the care of your patient.    Sincerely,     LISA Carlin [facility]

## 2018-03-15 NOTE — MR AVS SNAPSHOT
After Visit Summary   3/15/2018    Sachin Xavier    MRN: [id number]           Patient Information     Date Of Birth          1970        Visit Information        Provider Department      3/15/2018 3:10 PM Melania Benoit APRN CNP [facility]        Today's Diagnoses     STEMI involving right coronary artery (H)        Mixed hyperlipidemia        Essential hypertension          Care Instructions    Thanks for coming into [facility] today.    We discussed: Blood pressure log and treatment. BMP results. Recommendation for PCP follow up regarding elevated A1C    Medication changes:  Continue with current medical therapy.     Results: BMP and A1C results    Follow up: in 6 months with Dr. Farnsworth.     Please call my nurse at [phone] with any questions or concerns.    Scheduling phone number: [phone]  Reminder: Please bring in all current medications, over the counter supplements and vitamin bottles to your next appointment.                Follow-ups after your visit        Additional Services     Follow-Up with Cardiologist                 Future tests that were ordered for you today     Open Future Orders        Priority Expected Expires Ordered    Follow-Up with Cardiologist Routine 9/11/2018 3/15/2019 3/15/2018            Who to contact     If you have questions or need follow up information about today's clinic visit or your schedule please contact [facility] directly at [phone].  Normal or non-critical lab and imaging results will be communicated to you by MyChart, letter or phone within 4 business days after the clinic has received the results. If you do not hear from us within 7 days, please contact the clinic through MyChart or phone. If you have a critical or abnormal lab result, we will notify you by phone as soon as possible.  Submit refill requests through [employer] or  "call your pharmacy and they will forward the refill request to us. Please allow 3 business days for your refill to be completed.          Additional Information About Your Visit        MyChart Information     [employer] lets you send messages to your doctor, view your test results, renew your prescriptions, schedule appointments and more. To sign up, go to [email or web address] . Click on \"Log in\" on the left side of the screen, which will take you to the Welcome page. Then click on \"Sign up Now\" on the right side of the page.     You will be asked to enter the access code listed below, as well as some personal information. Please follow the directions to create your username and password.     Your access code is: [email or web address]  Expires: 2018 11:29 AM     Your access code will  in 90 days. If you need help or a new code, please call your [facility] clinic or [phone].        Care EveryWhere ID     This is your Care EveryWhere ID. This could be used by other organizations to access your [facility] medical records  [id number]        Your Vitals Were     Pulse Height BMI (Body Mass Index)             65 1.727 m (5' 7.99\") 32.52 kg/m2          Blood Pressure from Last 3 Encounters:   03/15/18 134/86   18 170/90   17 124/84    Weight from Last 3 Encounters:   03/15/18 97 kg (213 lb 12.8 oz)   18 97.1 kg (214 lb)   17 97.3 kg (214 lb 6.4 oz)              We Performed the Following     Follow-Up with Cardiac Advanced Practice Provider        Primary Care Provider Office Phone # Fax #    Peng Rosen [phone]       [address] [facility] [address]        Equal Access to Services     MAGDA WRIGHT : Jimena Strickland, waabe mustafa, qaybta kaalkay garnett, francis arroyo. [facility] [phone].    ATENCIÓN: Si habla español, tiene a pickett disposición servicios gratuitos de asistencia " lingüística. Dimitrios al [phone].    We comply with applicable federal civil rights laws and [place] laws. We do not discriminate on the basis of race, color, national origin, age, disability, sex, sexual orientation, or gender identity.            Thank you!     Thank you for choosing [facility]  for your care. Our goal is always to provide you with excellent care. Hearing back from our patients is one way we can continue to improve our services. Please take a few minutes to complete the written survey that you may receive in the mail after your visit with us. Thank you!             Your Updated Medication List - Protect others around you: Learn how to safely use, store and throw away your medicines at www.disposemymeds.org.          This list is accurate as of 3/15/18  4:00 PM.  Always use your most recent med list.                   Brand Name Dispense Instructions for use Diagnosis    aspirin 81 MG EC tablet      Take 1 tablet (81 mg) by mouth daily    ST elevation myocardial infarction involving right coronary artery (H)       atorvastatin 80 MG tablet    LIPITOR    90 tablet    Take 1 tablet (80 mg) by mouth daily    Hyperlipidemia LDL goal <70       carvedilol 6.25 MG tablet    COREG    180 tablet    Take 1 tablet (6.25 mg) by mouth 2 times daily    ST elevation myocardial infarction involving right coronary artery (H)       clopidogrel 75 MG tablet    PLAVIX    90 tablet    Take 1 tablet (75 mg) by mouth daily    STEMI involving right coronary artery (H)       lisinopril 10 MG tablet    PRINIVIL/ZESTRIL    180 tablet    Take 2 tablets (20 mg) by mouth 2 times daily    ST elevation myocardial infarction involving right coronary artery (H)       nitroGLYcerin 0.4 MG sublingual tablet    NITROSTAT    25 tablet    For chest pain place 1 tablet under the tongue every 5 minutes for 3 doses. If symptoms persist 5 minutes after 1st dose call 911.    ST elevation myocardial  infarction involving right coronary artery (H)

## 2018-03-15 NOTE — PATIENT INSTRUCTIONS
Thanks for coming into [facility] clinic today.    We discussed: Blood pressure log and treatment. BMP results. Recommendation for PCP follow up regarding elevated A1C    Medication changes:  Continue with current medical therapy.     Results: BMP and A1C results    Follow up: in 6 months with Dr. Farnsworth.     Please call my nurse at [phone] with any questions or concerns.    Scheduling phone number: [phone]  Reminder: Please bring in all current medications, over the counter supplements and vitamin bottles to your next appointment.

## 2018-10-11 ENCOUNTER — OFFICE VISIT (OUTPATIENT)
Dept: CARDIOLOGY | Facility: CLINIC | Age: 48
End: 2018-10-11
Attending: NURSE PRACTITIONER
Payer: COMMERCIAL

## 2018-10-11 VITALS
DIASTOLIC BLOOD PRESSURE: 89 MMHG | WEIGHT: 208.4 LBS | BODY MASS INDEX: 31.58 KG/M2 | HEART RATE: 51 BPM | HEIGHT: 68 IN | SYSTOLIC BLOOD PRESSURE: 148 MMHG

## 2018-10-11 DIAGNOSIS — I10 ESSENTIAL HYPERTENSION: ICD-10-CM

## 2018-10-11 DIAGNOSIS — E78.5 HYPERLIPIDEMIA LDL GOAL <70: ICD-10-CM

## 2018-10-11 LAB
CHOLEST SERPL-MCNC: 127 MG/DL
HBA1C MFR BLD: 6.3 % (ref 0–5.6)
HDLC SERPL-MCNC: 38 MG/DL
LDLC SERPL CALC-MCNC: 67 MG/DL
NONHDLC SERPL-MCNC: 89 MG/DL
TRIGL SERPL-MCNC: 109 MG/DL

## 2018-10-11 PROCEDURE — 83036 HEMOGLOBIN GLYCOSYLATED A1C: CPT | Performed by: NURSE PRACTITIONER

## 2018-10-11 PROCEDURE — 99214 OFFICE O/P EST MOD 30 MIN: CPT | Performed by: INTERNAL MEDICINE

## 2018-10-11 PROCEDURE — 80061 LIPID PANEL: CPT | Performed by: NURSE PRACTITIONER

## 2018-10-11 PROCEDURE — 36415 COLL VENOUS BLD VENIPUNCTURE: CPT | Performed by: NURSE PRACTITIONER

## 2018-10-11 NOTE — LETTER
"10/11/2018    Peng Rosen MD  [address] [facility] [place] [address]    RE: Sachin Xavier       Dear Colleague,    I had the pleasure of seeing Sachin Xavier in the [facility].    Cardiology Clinic Progress Note  Sachin Xavier MRN# [id number]   YOB: 1970 Age: 48 year old             History of Presenting Illness:    Sachin Xavier is a pleasant 48 year old patient here for a f/u  He carries a  history significant for STEMI status post CHELLE ×2 (10/2017), hypertension,and hyperlipidemia.     He feels well on a cardiac standpoint, denies chest pain, shortness of breath, PND,   orthopnea, presyncope, syncope, edema, heart racing, or palpitations.  He states after increasing the lisinopril he has had a complete resolution of symptoms.  He is able to participate in his normal extracurricular activities of competitive soccer without symptom.     He follows a heart healthy diet and watches strict sodium intake.  Compliant with all medications.  No new cardiac complaints.                   Assessment and Plan:      1.  Stable cardiovascular status   2.  He is going on a trip to [place] later this month   3.  discontinue Plavix Nov 2018   4.  Referral to nutrition      5.  RTC one year.           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  Prior to admission, he was working about 13-hour days 6-7 days a week, averaging about 30,000 steps a day.  He did this for 1 year.  He is now working less than 50 hours a week.  He grew up in [place], moved here when he was 28.  He comes in today with his wife, Elana, she works in human rights.  They have 2 children in their 20s           Physical Exam:     Vitals: /89  Pulse 51  Ht 1.727 m (5' 8\")  Wt 94.5 kg (208 lb 6.4 oz)  BMI 31.69 kg/m2    HEENT:  Normocephalic, atraumatic.   GENERAL:  Well-developed, in no acute " distress.   HEART:  Regular without murmur, rub or gallop.   LUNGS:  Clear with good airflow deep bilateral lobes.  Carotids are without bruit.   EXTREMITIES:  Without peripheral edema.   SKIN:  Warm and dry.       Current Outpatient Prescriptions   Medication Sig Dispense Refill     aspirin EC 81 MG EC tablet Take 1 tablet (81 mg) by mouth daily       atorvastatin (LIPITOR) 80 MG tablet Take 1 tablet (80 mg) by mouth daily 90 tablet 3     carvedilol (COREG) 6.25 MG tablet Take 1 tablet (6.25 mg) by mouth 2 times daily 180 tablet 3     clopidogrel (PLAVIX) 75 MG tablet Take 1 tablet (75 mg) by mouth daily 90 tablet 3     lisinopril (PRINIVIL/ZESTRIL) 20 MG tablet Take 1 tablet (20 mg) by mouth 2 times daily 30 tablet 3     metFORMIN (GLUCOPHAGE) 500 MG tablet Take 500 mg by mouth 2 times daily (with meals)       nitroGLYcerin (NITROSTAT) 0.4 MG sublingual tablet For chest pain place 1 tablet under the tongue every 5 minutes for 3 doses. If symptoms persist 5 minutes after 1st dose call 911. 25 tablet 3           Family History   Problem Relation Age of Onset     Coronary Artery Disease Father      Brain Tumor Father      Diabetes Father      Coronary Artery Disease Paternal Grandmother      Coronary Artery Disease Paternal Grandfather      Parkinsonism Mother      Family History Negative Maternal Grandmother      Family History Negative Maternal Grandfather      Family History Negative Brother      Family History Negative Son      Family History Negative Daughter        Social History     Social History     Marital status:      Spouse name: N/A     Number of children: N/A     Years of education: N/A     Occupational History     Not on file.     Social History Main Topics     Smoking status: Never Smoker     Smokeless tobacco: Never Used     Alcohol use Yes      Comment: rare     Drug use: No     Sexual activity: Not on file     Other Topics Concern     Not on file     Social History Narrative            Past  Medical History:     Past Medical History:   Diagnosis Date     Hyperlipidemia      Hypertension      STEMI (ST elevation myocardial infarction) (H)     thrombotitic lesion of RCA -CHELLE               Past Surgical History:     Past Surgical History:   Procedure Laterality Date     HERNIA REPAIR       ORTHOPEDIC SURGERY      acl repair              Allergies:   Review of patient's allergies indicates no known allergies.       Data:   All laboratory data reviewed:    LAST CHOLESTEROL:  Lab Results   Component Value Date    CHOL 130 03/08/2018     Lab Results   Component Value Date    HDL 35 03/08/2018     Lab Results   Component Value Date    LDL 63 03/08/2018     Lab Results   Component Value Date    TRIG 159 03/08/2018     No results found for: CHOLHDLRATIO    LAST BMP:  Lab Results   Component Value Date     03/15/2018      Lab Results   Component Value Date    POTASSIUM 4.3 03/15/2018     Lab Results   Component Value Date    CHLORIDE 105 03/15/2018     Lab Results   Component Value Date    JACEK 9.1 03/15/2018     Lab Results   Component Value Date    CO2 27 03/15/2018     Lab Results   Component Value Date    BUN 18 03/15/2018     Lab Results   Component Value Date    CR 1.16 03/15/2018     Lab Results   Component Value Date     03/15/2018     LAST CBC:  Lab Results   Component Value Date    WBC 10.9 10/25/2017     Lab Results   Component Value Date    RBC 4.93 10/25/2017     Lab Results   Component Value Date    HGB 14.6 10/25/2017     Lab Results   Component Value Date    HCT 41.8 10/25/2017     Lab Results   Component Value Date    MCV 85 10/25/2017     Lab Results   Component Value Date    MCH 29.6 10/25/2017     Lab Results   Component Value Date    MCHC 34.9 10/25/2017     Lab Results   Component Value Date    RDW 13.5 10/25/2017     Lab Results   Component Value Date     10/25/2017     Thank you for allowing me to participate in the care of your patient.    Sincerely,     Lashell  Gerald Farnsworth MD     [facility]

## 2018-10-11 NOTE — LETTER
"10/11/2018    Peng Rosen MD  [address] [facility] [place] [address]    RE: Sachin Xavier       Dear Colleague,    I had the pleasure of seeing Sachin Xavier in the [facility].    Cardiology Clinic Progress Note  Sachin Xavier MRN# [id number]   YOB: 1970 Age: 48 year old             History of Presenting Illness:    Sachin Xavier is a pleasant 48 year old patient here for a f/u  He carries a  history significant for STEMI status post CHELLE ×2 (10/2017), hypertension,and hyperlipidemia.     He feels well on a cardiac standpoint, denies chest pain, shortness of breath, PND,   orthopnea, presyncope, syncope, edema, heart racing, or palpitations.  He states after increasing the lisinopril he has had a complete resolution of symptoms.  He is able to participate in his normal extracurricular activities of competitive soccer without symptom.     He follows a heart healthy diet and watches strict sodium intake.  Compliant with all medications.  No new cardiac complaints.                   Assessment and Plan:      1.  Stable cardiovascular status   2.  He is going on a trip to [place] later this month   3.  discontinue Plavix Nov 2018   4.  Referral to nutrition      5.  RTC one year.           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  Prior to admission, he was working about 13-hour days 6-7 days a week, averaging about 30,000 steps a day.  He did this for 1 year.  He is now working less than 50 hours a week.  He grew up in [place], moved here when he was 28.  He comes in today with his wife, Elana, she works in human rights.  They have 2 children in their 20s           Physical Exam:     Vitals: /89  Pulse 51  Ht 1.727 m (5' 8\")  Wt 94.5 kg (208 lb 6.4 oz)  BMI 31.69 kg/m2    HEENT:  Normocephalic, atraumatic.   GENERAL:  Well-developed, in no acute " distress.   HEART:  Regular without murmur, rub or gallop.   LUNGS:  Clear with good airflow deep bilateral lobes.  Carotids are without bruit.   EXTREMITIES:  Without peripheral edema.   SKIN:  Warm and dry.       Current Outpatient Prescriptions   Medication Sig Dispense Refill     aspirin EC 81 MG EC tablet Take 1 tablet (81 mg) by mouth daily       atorvastatin (LIPITOR) 80 MG tablet Take 1 tablet (80 mg) by mouth daily 90 tablet 3     carvedilol (COREG) 6.25 MG tablet Take 1 tablet (6.25 mg) by mouth 2 times daily 180 tablet 3     clopidogrel (PLAVIX) 75 MG tablet Take 1 tablet (75 mg) by mouth daily 90 tablet 3     lisinopril (PRINIVIL/ZESTRIL) 20 MG tablet Take 1 tablet (20 mg) by mouth 2 times daily 30 tablet 3     metFORMIN (GLUCOPHAGE) 500 MG tablet Take 500 mg by mouth 2 times daily (with meals)       nitroGLYcerin (NITROSTAT) 0.4 MG sublingual tablet For chest pain place 1 tablet under the tongue every 5 minutes for 3 doses. If symptoms persist 5 minutes after 1st dose call 911. 25 tablet 3           Family History   Problem Relation Age of Onset     Coronary Artery Disease Father      Brain Tumor Father      Diabetes Father      Coronary Artery Disease Paternal Grandmother      Coronary Artery Disease Paternal Grandfather      Parkinsonism Mother      Family History Negative Maternal Grandmother      Family History Negative Maternal Grandfather      Family History Negative Brother      Family History Negative Son      Family History Negative Daughter        Social History     Social History     Marital status:      Spouse name: N/A     Number of children: N/A     Years of education: N/A     Occupational History     Not on file.     Social History Main Topics     Smoking status: Never Smoker     Smokeless tobacco: Never Used     Alcohol use Yes      Comment: rare     Drug use: No     Sexual activity: Not on file     Other Topics Concern     Not on file     Social History Narrative            Past  Medical History:     Past Medical History:   Diagnosis Date     Hyperlipidemia      Hypertension      STEMI (ST elevation myocardial infarction) (H)     thrombotitic lesion of RCA -CHELLE               Past Surgical History:     Past Surgical History:   Procedure Laterality Date     HERNIA REPAIR       ORTHOPEDIC SURGERY      acl repair              Allergies:   Review of patient's allergies indicates no known allergies.       Data:   All laboratory data reviewed:    LAST CHOLESTEROL:  Lab Results   Component Value Date    CHOL 130 03/08/2018     Lab Results   Component Value Date    HDL 35 03/08/2018     Lab Results   Component Value Date    LDL 63 03/08/2018     Lab Results   Component Value Date    TRIG 159 03/08/2018     No results found for: CHOLHDLRATIO    LAST BMP:  Lab Results   Component Value Date     03/15/2018      Lab Results   Component Value Date    POTASSIUM 4.3 03/15/2018     Lab Results   Component Value Date    CHLORIDE 105 03/15/2018     Lab Results   Component Value Date    JACEK 9.1 03/15/2018     Lab Results   Component Value Date    CO2 27 03/15/2018     Lab Results   Component Value Date    BUN 18 03/15/2018     Lab Results   Component Value Date    CR 1.16 03/15/2018     Lab Results   Component Value Date     03/15/2018       LAST CBC:  Lab Results   Component Value Date    WBC 10.9 10/25/2017     Lab Results   Component Value Date    RBC 4.93 10/25/2017     Lab Results   Component Value Date    HGB 14.6 10/25/2017     Lab Results   Component Value Date    HCT 41.8 10/25/2017     Lab Results   Component Value Date    MCV 85 10/25/2017     Lab Results   Component Value Date    MCH 29.6 10/25/2017     Lab Results   Component Value Date    MCHC 34.9 10/25/2017     Lab Results   Component Value Date    RDW 13.5 10/25/2017     Lab Results   Component Value Date     10/25/2017             Thank you for allowing me to participate in the care of your patient.      Sincerely,      Lashell Farnsworth MD     [facility]    cc:   Melania Benoit, LISA CNP  [address] [place] [address]

## 2018-10-11 NOTE — PROGRESS NOTES
"Cardiology Clinic Progress Note  Sachin Xavier MRN# [id number]   YOB: 1970 Age: 48 year old             History of Presenting Illness:    Sachin Xavier is a pleasant 48 year old patient here for a f/u  He carries a  history significant for STEMI status post CHELLE ×2 (10/2017), hypertension,and hyperlipidemia.     He feels well on a cardiac standpoint, denies chest pain, shortness of breath, PND,   orthopnea, presyncope, syncope, edema, heart racing, or palpitations.  He states after increasing the lisinopril he has had a complete resolution of symptoms.  He is able to participate in his normal extracurricular activities of competitive soccer without symptom.     He follows a heart healthy diet and watches strict sodium intake.  Compliant with all medications.  No new cardiac complaints.                   Assessment and Plan:      1.  Stable cardiovascular status   2.  He is going on a trip to [facility] later this month   3.  discontinue Plavix Nov 2018   4.  Referral to nutrition      5.  RTC one year.           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  Prior to admission, he was working about 13-hour days 6-7 days a week, averaging about 30,000 steps a day.  He did this for 1 year.  He is now working less than 50 hours a week.  He grew up in [place], moved here when he was 28.  He comes in today with his wife, Elana, she works in human rights.  They have 2 children in their 20s           Physical Exam:     Vitals: /89  Pulse 51  Ht 1.727 m (5' 8\")  Wt 94.5 kg (208 lb 6.4 oz)  BMI 31.69 kg/m2    HEENT:  Normocephalic, atraumatic.   GENERAL:  Well-developed, in no acute distress.   HEART:  Regular without murmur, rub or gallop.   LUNGS:  Clear with good airflow deep bilateral lobes.  Carotids are without bruit.   EXTREMITIES:  Without peripheral edema.   SKIN:  Warm and dry.       Current Outpatient Prescriptions   Medication Sig " Dispense Refill     aspirin EC 81 MG EC tablet Take 1 tablet (81 mg) by mouth daily       atorvastatin (LIPITOR) 80 MG tablet Take 1 tablet (80 mg) by mouth daily 90 tablet 3     carvedilol (COREG) 6.25 MG tablet Take 1 tablet (6.25 mg) by mouth 2 times daily 180 tablet 3     clopidogrel (PLAVIX) 75 MG tablet Take 1 tablet (75 mg) by mouth daily 90 tablet 3     lisinopril (PRINIVIL/ZESTRIL) 20 MG tablet Take 1 tablet (20 mg) by mouth 2 times daily 30 tablet 3     metFORMIN (GLUCOPHAGE) 500 MG tablet Take 500 mg by mouth 2 times daily (with meals)       nitroGLYcerin (NITROSTAT) 0.4 MG sublingual tablet For chest pain place 1 tablet under the tongue every 5 minutes for 3 doses. If symptoms persist 5 minutes after 1st dose call 911. 25 tablet 3           Family History   Problem Relation Age of Onset     Coronary Artery Disease Father      Brain Tumor Father      Diabetes Father      Coronary Artery Disease Paternal Grandmother      Coronary Artery Disease Paternal Grandfather      Parkinsonism Mother      Family History Negative Maternal Grandmother      Family History Negative Maternal Grandfather      Family History Negative Brother      Family History Negative Son      Family History Negative Daughter        Social History     Social History     Marital status:      Spouse name: N/A     Number of children: N/A     Years of education: N/A     Occupational History     Not on file.     Social History Main Topics     Smoking status: Never Smoker     Smokeless tobacco: Never Used     Alcohol use Yes      Comment: rare     Drug use: No     Sexual activity: Not on file     Other Topics Concern     Not on file     Social History Narrative            Past Medical History:     Past Medical History:   Diagnosis Date     Hyperlipidemia      Hypertension      STEMI (ST elevation myocardial infarction) (H)     thrombotitic lesion of RCA -CHELLE               Past Surgical History:     Past Surgical History:    Procedure Laterality Date     HERNIA REPAIR       ORTHOPEDIC SURGERY      acl repair              Allergies:   Review of patient's allergies indicates no known allergies.       Data:   All laboratory data reviewed:    LAST CHOLESTEROL:  Lab Results   Component Value Date    CHOL 130 03/08/2018     Lab Results   Component Value Date    HDL 35 03/08/2018     Lab Results   Component Value Date    LDL 63 03/08/2018     Lab Results   Component Value Date    TRIG 159 03/08/2018     No results found for: CHOLHDLRATIO    LAST BMP:  Lab Results   Component Value Date     03/15/2018      Lab Results   Component Value Date    POTASSIUM 4.3 03/15/2018     Lab Results   Component Value Date    CHLORIDE 105 03/15/2018     Lab Results   Component Value Date    JACEK 9.1 03/15/2018     Lab Results   Component Value Date    CO2 27 03/15/2018     Lab Results   Component Value Date    BUN 18 03/15/2018     Lab Results   Component Value Date    CR 1.16 03/15/2018     Lab Results   Component Value Date     03/15/2018       LAST CBC:  Lab Results   Component Value Date    WBC 10.9 10/25/2017     Lab Results   Component Value Date    RBC 4.93 10/25/2017     Lab Results   Component Value Date    HGB 14.6 10/25/2017     Lab Results   Component Value Date    HCT 41.8 10/25/2017     Lab Results   Component Value Date    MCV 85 10/25/2017     Lab Results   Component Value Date    MCH 29.6 10/25/2017     Lab Results   Component Value Date    MCHC 34.9 10/25/2017     Lab Results   Component Value Date    RDW 13.5 10/25/2017     Lab Results   Component Value Date     10/25/2017

## 2018-10-11 NOTE — MR AVS SNAPSHOT
After Visit Summary   10/11/2018    Sachin Xavier    MRN: [id number]           Patient Information     Date Of Birth          1970        Visit Information        Provider Department      10/11/2018 10:45 AM Lashell Farnsworth MD [facility]        Today's Diagnoses     Essential hypertension           Follow-ups after your visit        Additional Services     DIABETES EDUCATOR REFERRAL       DIABETES SELF MANAGEMENT TRAINING (DSMT)      Your provider has referred you to Diabetes Education: FMG: Diabetes Education - All [facility] [phone]   [email or web address]     If an urgent visit is needed or A1C is above 12, Care Team to call the Diabetes  Education Team at [phone] or send an In Basket message to the Diabetes Education Pool (P DIAB ED-PATIENT CARE).    A  will call you to make your appointment. If it has been more than 3 business days since your referral was placed, please call the above phone number to schedule.    Type of training and number of hours: New Diagnosis: Initial group DSMT - 10 hours.      Diabetes Type: Pre-Diabetes - Patient to call [phone] for Pre-Diabetes Class        Diabetes Education Topics: Comprehensive Knowledge Assessment and Instruction    Special Educational Needs Requiring Individual DSMT: None      Please be aware that coverage of these services is subject to the terms and limitations of your health insurance plan.  Call member services at your health plan to determine Diabetes Self-Management Training (Codes  and ) and Medical Nutrition Therapy (Codes 03710 and [id number]) benefits and ask which blood glucose monitor brands are covered by your plan.  Please bring the following with you to your appointment:    (1)  List of current medications   (2)  List of Blood Glucose Monitor brands that are covered by your insurance plan  (3)   Blood Glucose Monitor and log book  (4)   Food records for the 3 days prior to your visit    The Certified Diabetes Educator may make diabetes medication adjustments per the CDE Protocol and Collaborative Practice Agreement.            Follow-Up with Cardiologist                 Your next 10 appointments already scheduled     Oct 11, 2018 11:10 AM CDT   LAB with [facility]   [facility] ([facility])    [address] [place] [address] [address]  [place]  [place] [address]   [phone]           Please do not eat 10-12 hours before your appointment if you are coming in fasting for labs on lipids, cholesterol, or glucose (sugar). This does not apply to pregnant women. Water, hot tea and black coffee (with nothing added) are okay. Do not drink other fluids, diet soda or chew gum.              Future tests that were ordered for you today     Open Future Orders        Priority Expected Expires Ordered    Follow-Up with Cardiologist Routine 10/11/2019 10/12/2019 10/11/2018    Lipid Profile Routine 10/11/2019 10/11/2019 10/11/2018    ALT Routine 10/11/2019 10/11/2019 10/11/2018    Basic metabolic panel Routine 10/11/2019 10/12/2019 10/11/2018    DIABETES EDUCATOR REFERRAL Routine 10/11/2018 10/10/2020 10/11/2018    Hemoglobin A1c Routine 10/11/2018 10/11/2019 10/11/2018            Who to contact     If you have questions or need follow up information about today's clinic visit or your schedule please contact [facility]   [place] directly at [phone].  Normal or non-critical lab and imaging results will be communicated to you by MyChart, letter or phone within 4 business days after the clinic has received the results. If you do not hear from us within 7 days, please contact the clinic through MyChart or phone. If you have a critical or abnormal lab result, we will notify you by phone as soon as possible.  Submit refill requests through [employer] or call your  "pharmacy and they will forward the refill request to us. Please allow 3 business days for your refill to be completed.          Additional Information About Your Visit        MyChart Information     [employer] lets you send messages to your doctor, view your test results, renew your prescriptions, schedule appointments and more. To sign up, go to [email or web address] . Click on \"Log in\" on the left side of the screen, which will take you to the Welcome page. Then click on \"Sign up Now\" on the right side of the page.     You will be asked to enter the access code listed below, as well as some personal information. Please follow the directions to create your username and password.     Your access code is: [email or web address]  Expires: 2019 10:55 AM     Your access code will  in 90 days. If you need help or a new code, please call your [place] clinic or [phone].        Care EveryWhere ID     This is your Care EveryWhere ID. This could be used by other organizations to access your [place] medical records  [id number]        Your Vitals Were     Pulse Height BMI (Body Mass Index)             51 1.727 m (5' 8\") 31.69 kg/m2          Blood Pressure from Last 3 Encounters:   10/11/18 148/89   03/15/18 134/86   18 170/90    Weight from Last 3 Encounters:   10/11/18 94.5 kg (208 lb 6.4 oz)   03/15/18 97 kg (213 lb 12.8 oz)   18 97.1 kg (214 lb)              We Performed the Following     Follow-Up with Cardiologist        Primary Care Provider Office Phone # Fax #    Peng Rosen [phone]       [facility] [place] [facility] [address] [place] [address]        Equal Access to Services     [facility] KYLE : Jimena Strickland, waabe mustafa, qaybta kaalmamaura garnett, francis arroyo. [facility] [phone].    ATENCIÓN: Si habla español, tiene a pickett disposición servicios gratuitos de asistencia lingüística. Llame al " [phone].    We comply with applicable federal civil rights laws and [place] laws. We do not discriminate on the basis of race, color, national origin, age, disability, sex, sexual orientation, or gender identity.            Thank you!     Thank you for choosing [facility]  for your care. Our goal is always to provide you with excellent care. Hearing back from our patients is one way we can continue to improve our services. Please take a few minutes to complete the written survey that you may receive in the mail after your visit with us. Thank you!             Your Updated Medication List - Protect others around you: Learn how to safely use, store and throw away your medicines at www.disposemymeds.org.          This list is accurate as of 10/11/18 10:55 AM.  Always use your most recent med list.                   Brand Name Dispense Instructions for use Diagnosis    aspirin 81 MG EC tablet      Take 1 tablet (81 mg) by mouth daily    ST elevation myocardial infarction involving right coronary artery (H)       atorvastatin 80 MG tablet    LIPITOR    90 tablet    Take 1 tablet (80 mg) by mouth daily    Hyperlipidemia LDL goal <70       carvedilol 6.25 MG tablet    COREG    180 tablet    Take 1 tablet (6.25 mg) by mouth 2 times daily    ST elevation myocardial infarction involving right coronary artery (H)       clopidogrel 75 MG tablet    PLAVIX    90 tablet    Take 1 tablet (75 mg) by mouth daily    STEMI involving right coronary artery (H)       lisinopril 20 MG tablet    PRINIVIL/ZESTRIL    30 tablet    Take 1 tablet (20 mg) by mouth 2 times daily    Essential hypertension       metFORMIN 500 MG tablet    GLUCOPHAGE     Take 500 mg by mouth 2 times daily (with meals)        nitroGLYcerin 0.4 MG sublingual tablet    NITROSTAT    25 tablet    For chest pain place 1 tablet under the tongue every 5 minutes for 3 doses. If symptoms persist 5 minutes after 1st dose call 911.     ST elevation myocardial infarction involving right coronary artery (H)

## 2019-01-17 DIAGNOSIS — E78.5 HYPERLIPIDEMIA LDL GOAL <70: ICD-10-CM

## 2019-01-17 RX ORDER — ATORVASTATIN CALCIUM 80 MG/1
80 TABLET, FILM COATED ORAL DAILY
Qty: 90 TABLET | Refills: 2 | Status: SHIPPED | OUTPATIENT
Start: 2019-01-17 | End: 2020-04-03

## 2019-01-18 DIAGNOSIS — I21.11 ST ELEVATION MYOCARDIAL INFARCTION INVOLVING RIGHT CORONARY ARTERY (H): ICD-10-CM

## 2019-01-18 RX ORDER — CARVEDILOL 6.25 MG/1
6.25 TABLET ORAL 2 TIMES DAILY
Qty: 180 TABLET | Refills: 2 | Status: SHIPPED | OUTPATIENT
Start: 2019-01-18 | End: 2020-04-03

## 2019-07-08 DIAGNOSIS — I21.11 ST ELEVATION MYOCARDIAL INFARCTION INVOLVING RIGHT CORONARY ARTERY (H): ICD-10-CM

## 2019-07-08 RX ORDER — NITROGLYCERIN 0.4 MG/1
TABLET SUBLINGUAL
Qty: 25 TABLET | Refills: 0 | Status: SHIPPED | OUTPATIENT
Start: 2019-07-08 | End: 2022-09-18

## 2020-03-06 ENCOUNTER — TRANSFERRED RECORDS (OUTPATIENT)
Dept: HEALTH INFORMATION MANAGEMENT | Facility: CLINIC | Age: 50
End: 2020-03-06

## 2020-04-03 ENCOUNTER — VIRTUAL VISIT (OUTPATIENT)
Dept: CARDIOLOGY | Facility: CLINIC | Age: 50
End: 2020-04-03
Payer: COMMERCIAL

## 2020-04-03 VITALS
HEART RATE: 60 BPM | SYSTOLIC BLOOD PRESSURE: 128 MMHG | HEIGHT: 68 IN | BODY MASS INDEX: 31.37 KG/M2 | WEIGHT: 207 LBS | DIASTOLIC BLOOD PRESSURE: 83 MMHG

## 2020-04-03 DIAGNOSIS — I10 ESSENTIAL HYPERTENSION: ICD-10-CM

## 2020-04-03 DIAGNOSIS — I25.10 CORONARY ARTERY DISEASE INVOLVING NATIVE CORONARY ARTERY OF NATIVE HEART WITHOUT ANGINA PECTORIS: ICD-10-CM

## 2020-04-03 DIAGNOSIS — I21.11 STEMI INVOLVING RIGHT CORONARY ARTERY (H): ICD-10-CM

## 2020-04-03 DIAGNOSIS — I21.11 ST ELEVATION MYOCARDIAL INFARCTION INVOLVING RIGHT CORONARY ARTERY (H): ICD-10-CM

## 2020-04-03 DIAGNOSIS — E78.5 HYPERLIPIDEMIA LDL GOAL <70: Primary | ICD-10-CM

## 2020-04-03 PROCEDURE — 99212 OFFICE O/P EST SF 10 MIN: CPT | Mod: TEL | Performed by: INTERNAL MEDICINE

## 2020-04-03 RX ORDER — CARVEDILOL 6.25 MG/1
6.25 TABLET ORAL 2 TIMES DAILY
Qty: 180 TABLET | Refills: 3 | Status: SHIPPED | OUTPATIENT
Start: 2020-04-03

## 2020-04-03 RX ORDER — LISINOPRIL 20 MG/1
20 TABLET ORAL 2 TIMES DAILY
Qty: 90 TABLET | Refills: 3 | Status: SHIPPED | OUTPATIENT
Start: 2020-04-03 | End: 2020-12-07

## 2020-04-03 RX ORDER — ATORVASTATIN CALCIUM 80 MG/1
80 TABLET, FILM COATED ORAL DAILY
Qty: 90 TABLET | Refills: 3 | Status: SHIPPED | OUTPATIENT
Start: 2020-04-03

## 2020-04-03 RX ORDER — CLOPIDOGREL BISULFATE 75 MG/1
75 TABLET ORAL DAILY
Qty: 90 TABLET | Refills: 3 | Status: SHIPPED | OUTPATIENT
Start: 2020-04-03 | End: 2020-04-03

## 2020-04-03 ASSESSMENT — MIFFLIN-ST. JEOR: SCORE: 1763.45

## 2020-04-03 NOTE — PROGRESS NOTES
"Sachin Xavier is a 50 year old male who is being evaluated via a billable telephone visit.      The patient has been notified of following:     \"This telephone visit will be conducted via a call between you and your physician/provider. We have found that certain health care needs can be provided without the need for a physical exam.  This service lets us provide the care you need with a short phone conversation.  If a prescription is necessary we can send it directly to your pharmacy.  If lab work is needed we can place an order for that and you can then stop by our lab to have the test done at a later time.    If during the course of the call the physician/provider feels a telephone visit is not appropriate, you will not be charged for this service.\"     Patient has given verbal consent for Telephone visit?  Yes    Sachin Xavier is a 50-year-old male who I last saw in 2018.  He suffered an inferior STEMI in 2017 for which he underwent emergent PCI with good technical result with my colleagues.  Mild disease was noted elsewhere.  LV systolic function is preserved he had a cardiac MRI demonstrating resolution and good LV recovery.  He has not had any further symptoms since that time.  He completed a 1 year course of dual antiplatelet therapy without difficulty.  This past summer and winter he has been playing soccer without any return of symptoms.    I have reviewed and updated the patient's Past Medical History, Social History, Family History and Medication List.    ALLERGIES  Patient has no known allergies.     Patient has no concerns today.    Refills pended.    Vitals obtained at home today are:  /83  Pulse 60   Weight 207.0 lb  JEREMIAH Gutierrez    Additional provider notes:     Patient is doing well from a cardiovascular perspective.  He is on appropriate guideline directed medical therapy.  We will continue with his current medications and I will have him return to clinic in 1 years " time.  We will have a surveillance nuclear myocardial perfusion study performed next year.        Phone call duration: 10 minutes    Lashell Farnsworth MD

## 2020-12-07 DIAGNOSIS — I10 ESSENTIAL HYPERTENSION: ICD-10-CM

## 2020-12-07 RX ORDER — LISINOPRIL 20 MG/1
20 TABLET ORAL 2 TIMES DAILY
Qty: 180 TABLET | Refills: 1 | Status: SHIPPED | OUTPATIENT
Start: 2020-12-07 | End: 2021-10-01

## 2020-12-17 ENCOUNTER — OFFICE VISIT (OUTPATIENT)
Dept: CARDIOLOGY | Facility: CLINIC | Age: 50
End: 2020-12-17
Payer: COMMERCIAL

## 2020-12-17 VITALS
BODY MASS INDEX: 31.07 KG/M2 | HEIGHT: 68 IN | WEIGHT: 205 LBS | HEART RATE: 80 BPM | DIASTOLIC BLOOD PRESSURE: 75 MMHG | SYSTOLIC BLOOD PRESSURE: 115 MMHG

## 2020-12-17 DIAGNOSIS — I25.10 CORONARY ARTERY DISEASE INVOLVING NATIVE CORONARY ARTERY OF NATIVE HEART WITHOUT ANGINA PECTORIS: ICD-10-CM

## 2020-12-17 DIAGNOSIS — E78.5 HYPERLIPIDEMIA LDL GOAL <70: ICD-10-CM

## 2020-12-17 DIAGNOSIS — I10 ESSENTIAL HYPERTENSION: Primary | ICD-10-CM

## 2020-12-17 PROCEDURE — 99214 OFFICE O/P EST MOD 30 MIN: CPT | Performed by: INTERNAL MEDICINE

## 2020-12-17 RX ORDER — FAMOTIDINE 20 MG/1
20 TABLET, FILM COATED ORAL DAILY
COMMUNITY

## 2020-12-17 ASSESSMENT — MIFFLIN-ST. JEOR: SCORE: 1754.37

## 2020-12-17 NOTE — LETTER
12/17/2020    Erik Haltson, [place] [facility] [address] [place] [address]    RE: Sachin ROBERTSON Cristalsam Duc       Dear Colleague,    I had the pleasure of seeing Sachin ROBERTSON Jamisonus Xavier in the [facility].    Cardiology Clinic Progress Note        History of Presenting Illness:    Patient is a very pleasant 50-year-old gentleman who I followed for several years.  We had last seen him via virtual visit earlier this year due to the Covid pandemic.  He has a notable history of emergent PCI due to inferior STEMI in 2017.  He had PCI with my colleagues with good technical result.  Mild disease was noted elsewhere.  Fortunately he had very good recovery with follow-up MRI and echocardiogram demonstrating resolution of his previously described wall motion normalities.  He had an episode of neck and back pain along with left shoulder pain.  This ultimately led to a recent visit to an outside ER.  He was evaluated with serial biomarkers which were negative for troponin elevation.  He also had EKG which was performed demonstrating baseline findings.  Ultimately underwent a stress echocardiogram demonstrating no evidence of ischemia with good exercise tolerance and capacity.  His symptoms have resolved.  And they were attributed to back pain, noncardiac in nature.  He had hypertension on presentation however he is currently normotensive on his medical therapy.  Here for a follow-up after being seen in the emergency room at an outside facility.                   Assessment and Plan:      1.  Stable cardiovascular status   2.  We reviewed his prior testing.  Namely EKG, lab work and stress echocardiogram.   3.  Continue present medical therapy.  He is at goal from an LDL standpoint, would continue with statin therapy   4.  Can cancel his surveillance stress test which we had ordered for next year as he had a normal stress echocardiogram a  "few days ago.   5.  Return to clinic summer 2021 with preclinic cholesterol and BMP.         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  Prior to admission, he was working about 13-hour days 6-7 days a week, averaging about 30,000 steps a day.  He did this for 1 year.  He is now working less than 50 hours a week.  He grew up in [place], moved here when he was 28.  He comes in today with his wife, Elana, she works in human rights.  They have 2 children in their 20s           Physical Exam:     Vitals: /75   Pulse 80   Ht 1.727 m (5' 8\")   Wt 93 kg (205 lb)   BMI 31.17 kg/m      HEENT:  Normocephalic, atraumatic.   GENERAL:  Well-developed, in no acute distress.   HEART:  Regular without murmur, rub or gallop.   LUNGS:  Clear with good airflow deep bilateral lobes.  Carotids are without bruit.   EXTREMITIES:  Without peripheral edema.   SKIN:  Warm and dry.       Current Outpatient Medications   Medication Sig Dispense Refill     aspirin EC 81 MG EC tablet Take 1 tablet (81 mg) by mouth daily       atorvastatin (LIPITOR) 80 MG tablet Take 1 tablet (80 mg) by mouth daily 90 tablet 3     carvedilol (COREG) 6.25 MG tablet Take 1 tablet (6.25 mg) by mouth 2 times daily 180 tablet 3     famotidine (PEPCID) 20 MG tablet Take 20 mg by mouth daily       lisinopril (ZESTRIL) 20 MG tablet Take 1 tablet (20 mg) by mouth 2 times daily 180 tablet 1     metFORMIN (GLUCOPHAGE) 500 MG tablet Take 500 mg by mouth 2 times daily (with meals)       nitroGLYcerin (NITROSTAT) 0.4 MG sublingual tablet For chest pain place 1 tablet under the tongue every 5 minutes for 3 doses. If symptoms persist 5 minutes after 1st dose call 911. 25 tablet 0           Family History   Problem Relation Age of Onset     Coronary Artery Disease Father      Brain Tumor Father      Diabetes Father      Coronary Artery Disease Paternal Grandmother      Coronary Artery Disease Paternal Grandfather      " Parkinsonism Mother      Family History Negative Maternal Grandmother      Family History Negative Maternal Grandfather      Family History Negative Brother      Family History Negative Son      Family History Negative Daughter        Social History     Socioeconomic History     Marital status:      Spouse name: Not on file     Number of children: Not on file     Years of education: Not on file     Highest education level: Not on file   Occupational History     Not on file   Social Needs     Financial resource strain: Not on file     Food insecurity     Worry: Not on file     Inability: Not on file     Transportation needs     Medical: Not on file     Non-medical: Not on file   Tobacco Use     Smoking status: Never Smoker     Smokeless tobacco: Never Used   Substance and Sexual Activity     Alcohol use: Yes     Comment: rare     Drug use: No     Sexual activity: Not on file   Lifestyle     Physical activity     Days per week: Not on file     Minutes per session: Not on file     Stress: Not on file   Relationships     Social connections     Talks on phone: Not on file     Gets together: Not on file     Attends Judaism service: Not on file     Active member of club or organization: Not on file     Attends meetings of clubs or organizations: Not on file     Relationship status: Not on file     Intimate partner violence     Fear of current or ex partner: Not on file     Emotionally abused: Not on file     Physically abused: Not on file     Forced sexual activity: Not on file   Other Topics Concern     Parent/sibling w/ CABG, MI or angioplasty before 65F 55M? Not Asked   Social History Narrative     Not on file            Past Medical History:     Past Medical History:   Diagnosis Date     Hyperlipidemia      Hypertension      STEMI (ST elevation myocardial infarction) (H)     thrombotitic lesion of RCA -CHELLE               Past Surgical History:     Past Surgical History:   Procedure Laterality Date      HERNIA REPAIR       ORTHOPEDIC SURGERY      acl repair              Allergies:   Patient has no known allergies.       Data:   All laboratory data reviewed:    LAST CHOLESTEROL:  Lab Results   Component Value Date    CHOL 130 03/08/2018     Lab Results   Component Value Date    HDL 35 03/08/2018     Lab Results   Component Value Date    LDL 63 03/08/2018     Lab Results   Component Value Date    TRIG 159 03/08/2018     No results found for: CHOLHDLRATIO    LAST BMP:  Lab Results   Component Value Date     03/15/2018      Lab Results   Component Value Date    POTASSIUM 4.3 03/15/2018     Lab Results   Component Value Date    CHLORIDE 105 03/15/2018     Lab Results   Component Value Date    JACEK 9.1 03/15/2018     Lab Results   Component Value Date    CO2 27 03/15/2018     Lab Results   Component Value Date    BUN 18 03/15/2018     Lab Results   Component Value Date    CR 1.16 03/15/2018     Lab Results   Component Value Date     03/15/2018       LAST CBC:  Lab Results   Component Value Date    WBC 10.9 10/25/2017     Lab Results   Component Value Date    RBC 4.93 10/25/2017     Lab Results   Component Value Date    HGB 14.6 10/25/2017     Lab Results   Component Value Date    HCT 41.8 10/25/2017     Lab Results   Component Value Date    MCV 85 10/25/2017     Lab Results   Component Value Date    MCH 29.6 10/25/2017     Lab Results   Component Value Date    MCHC 34.9 10/25/2017     Lab Results   Component Value Date    RDW 13.5 10/25/2017     Lab Results   Component Value Date     10/25/2017               Thank you for allowing me to participate in the care of your patient.      Sincerely,     Lashell Farnsworth MD     [facility]    cc:   No referring provider defined for this encounter.

## 2020-12-17 NOTE — LETTER
12/17/2020    Erik Haltson, PA [facility] [address] [facility] [address] [place] [address]    RE: Sachin ROBERTSON Cristalsam Duc       Dear Colleague,    I had the pleasure of seeing Sachin ROBERTSON Jamisonus Xavier in the [facility].    Cardiology Clinic Progress Note        History of Presenting Illness:    Patient is a very pleasant 50-year-old gentleman who I followed for several years.  We had last seen him via virtual visit earlier this year due to the Covid pandemic.  He has a notable history of emergent PCI due to inferior STEMI in 2017.  He had PCI with my colleagues with good technical result.  Mild disease was noted elsewhere.  Fortunately he had very good recovery with follow-up MRI and echocardiogram demonstrating resolution of his previously described wall motion normalities.  He had an episode of neck and back pain along with left shoulder pain.  This ultimately led to a recent visit to an outside ER.  He was evaluated with serial biomarkers which were negative for troponin elevation.  He also had EKG which was performed demonstrating baseline findings.  Ultimately underwent a stress echocardiogram demonstrating no evidence of ischemia with good exercise tolerance and capacity.  His symptoms have resolved.  And they were attributed to back pain, noncardiac in nature.  He had hypertension on presentation however he is currently normotensive on his medical therapy.  Here for a follow-up after being seen in the emergency room at an outside facility.                   Assessment and Plan:      1.  Stable cardiovascular status   2.  We reviewed his prior testing.  Namely EKG, lab work and stress echocardiogram.   3.  Continue present medical therapy.  He is at goal from an LDL standpoint, would continue with statin therapy   4.  Can cancel his surveillance stress test which we had ordered for next year as he had a normal stress echocardiogram a  "few days ago.   5.  Return to clinic summer 2021 with preclinic cholesterol and BMP.         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  Prior to admission, he was working about 13-hour days 6-7 days a week, averaging about 30,000 steps a day.  He did this for 1 year.  He is now working less than 50 hours a week.  He grew up in [place], moved here when he was 28.  He comes in today with his wife, Elana, she works in human rights.  They have 2 children in their 20s           Physical Exam:     Vitals: /75   Pulse 80   Ht 1.727 m (5' 8\")   Wt 93 kg (205 lb)   BMI 31.17 kg/m      HEENT:  Normocephalic, atraumatic.   GENERAL:  Well-developed, in no acute distress.   HEART:  Regular without murmur, rub or gallop.   LUNGS:  Clear with good airflow deep bilateral lobes.  Carotids are without bruit.   EXTREMITIES:  Without peripheral edema.   SKIN:  Warm and dry.       Current Outpatient Medications   Medication Sig Dispense Refill     aspirin EC 81 MG EC tablet Take 1 tablet (81 mg) by mouth daily       atorvastatin (LIPITOR) 80 MG tablet Take 1 tablet (80 mg) by mouth daily 90 tablet 3     carvedilol (COREG) 6.25 MG tablet Take 1 tablet (6.25 mg) by mouth 2 times daily 180 tablet 3     famotidine (PEPCID) 20 MG tablet Take 20 mg by mouth daily       lisinopril (ZESTRIL) 20 MG tablet Take 1 tablet (20 mg) by mouth 2 times daily 180 tablet 1     metFORMIN (GLUCOPHAGE) 500 MG tablet Take 500 mg by mouth 2 times daily (with meals)       nitroGLYcerin (NITROSTAT) 0.4 MG sublingual tablet For chest pain place 1 tablet under the tongue every 5 minutes for 3 doses. If symptoms persist 5 minutes after 1st dose call 911. 25 tablet 0           Family History   Problem Relation Age of Onset     Coronary Artery Disease Father      Brain Tumor Father      Diabetes Father      Coronary Artery Disease Paternal Grandmother      Coronary Artery Disease Paternal Grandfather      " Parkinsonism Mother      Family History Negative Maternal Grandmother      Family History Negative Maternal Grandfather      Family History Negative Brother      Family History Negative Son      Family History Negative Daughter        Social History     Socioeconomic History     Marital status:      Spouse name: Not on file     Number of children: Not on file     Years of education: Not on file     Highest education level: Not on file   Occupational History     Not on file   Social Needs     Financial resource strain: Not on file     Food insecurity     Worry: Not on file     Inability: Not on file     Transportation needs     Medical: Not on file     Non-medical: Not on file   Tobacco Use     Smoking status: Never Smoker     Smokeless tobacco: Never Used   Substance and Sexual Activity     Alcohol use: Yes     Comment: rare     Drug use: No     Sexual activity: Not on file   Lifestyle     Physical activity     Days per week: Not on file     Minutes per session: Not on file     Stress: Not on file   Relationships     Social connections     Talks on phone: Not on file     Gets together: Not on file     Attends Mu-[employer] service: Not on file     Active member of club or organization: Not on file     Attends meetings of clubs or organizations: Not on file     Relationship status: Not on file     Intimate partner violence     Fear of current or ex partner: Not on file     Emotionally abused: Not on file     Physically abused: Not on file     Forced sexual activity: Not on file   Other Topics Concern     Parent/sibling w/ CABG, MI or angioplasty before 65F 55M? Not Asked   Social History Narrative     Not on file            Past Medical History:     Past Medical History:   Diagnosis Date     Hyperlipidemia      Hypertension      STEMI (ST elevation myocardial infarction) (H)     thrombotitic lesion of RCA -CHELLE               Past Surgical History:     Past Surgical History:   Procedure Laterality Date      HERNIA REPAIR       ORTHOPEDIC SURGERY      acl repair              Allergies:   Patient has no known allergies.       Data:   All laboratory data reviewed:    LAST CHOLESTEROL:  Lab Results   Component Value Date    CHOL 130 03/08/2018     Lab Results   Component Value Date    HDL 35 03/08/2018     Lab Results   Component Value Date    LDL 63 03/08/2018     Lab Results   Component Value Date    TRIG 159 03/08/2018     No results found for: CHOLHDLRATIO    LAST BMP:  Lab Results   Component Value Date     03/15/2018      Lab Results   Component Value Date    POTASSIUM 4.3 03/15/2018     Lab Results   Component Value Date    CHLORIDE 105 03/15/2018     Lab Results   Component Value Date    JACEK 9.1 03/15/2018     Lab Results   Component Value Date    CO2 27 03/15/2018     Lab Results   Component Value Date    BUN 18 03/15/2018     Lab Results   Component Value Date    CR 1.16 03/15/2018     Lab Results   Component Value Date     03/15/2018       LAST CBC:  Lab Results   Component Value Date    WBC 10.9 10/25/2017     Lab Results   Component Value Date    RBC 4.93 10/25/2017     Lab Results   Component Value Date    HGB 14.6 10/25/2017     Lab Results   Component Value Date    HCT 41.8 10/25/2017     Lab Results   Component Value Date    MCV 85 10/25/2017     Lab Results   Component Value Date    MCH 29.6 10/25/2017     Lab Results   Component Value Date    MCHC 34.9 10/25/2017     Lab Results   Component Value Date    RDW 13.5 10/25/2017     Lab Results   Component Value Date     10/25/2017       Thank you for allowing me to participate in the care of your patient.    Sincerely,     Lashell Farnsworth MD     [facility]

## 2020-12-17 NOTE — PROGRESS NOTES
Cardiology Clinic Progress Note        History of Presenting Illness:    Patient is a very pleasant 50-year-old gentleman who I followed for several years.  We had last seen him via virtual visit earlier this year due to the Covid pandemic.  He has a notable history of emergent PCI due to inferior STEMI in 2017.  He had PCI with my colleagues with good technical result.  Mild disease was noted elsewhere.  Fortunately he had very good recovery with follow-up MRI and echocardiogram demonstrating resolution of his previously described wall motion normalities.  He had an episode of neck and back pain along with left shoulder pain.  This ultimately led to a recent visit to an outside ER.  He was evaluated with serial biomarkers which were negative for troponin elevation.  He also had EKG which was performed demonstrating baseline findings.  Ultimately underwent a stress echocardiogram demonstrating no evidence of ischemia with good exercise tolerance and capacity.  His symptoms have resolved.  And they were attributed to back pain, noncardiac in nature.  He had hypertension on presentation however he is currently normotensive on his medical therapy.  Here for a follow-up after being seen in the emergency room at an outside facility.                   Assessment and Plan:      1.  Stable cardiovascular status   2.  We reviewed his prior testing.  Namely EKG, lab work and stress echocardiogram.   3.  Continue present medical therapy.  He is at goal from an LDL standpoint, would continue with statin therapy   4.  Can cancel his surveillance stress test which we had ordered for next year as he had a normal stress echocardiogram a few days ago.   5.  Return to clinic summer 2021 with preclinic cholesterol and BMP.         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  Prior to admission, he was working about 13-hour days 6-7 days a week, averaging about 30,000 steps a day.  He did  "this for 1 year.  He is now working less than 50 hours a week.  He grew up in [place], moved here when he was 28.  He comes in today with his wife, Elana, she works in human rights.  They have 2 children in their 20s           Physical Exam:     Vitals: /75   Pulse 80   Ht 1.727 m (5' 8\")   Wt 93 kg (205 lb)   BMI 31.17 kg/m      HEENT:  Normocephalic, atraumatic.   GENERAL:  Well-developed, in no acute distress.   HEART:  Regular without murmur, rub or gallop.   LUNGS:  Clear with good airflow deep bilateral lobes.  Carotids are without bruit.   EXTREMITIES:  Without peripheral edema.   SKIN:  Warm and dry.       Current Outpatient Medications   Medication Sig Dispense Refill     aspirin EC 81 MG EC tablet Take 1 tablet (81 mg) by mouth daily       atorvastatin (LIPITOR) 80 MG tablet Take 1 tablet (80 mg) by mouth daily 90 tablet 3     carvedilol (COREG) 6.25 MG tablet Take 1 tablet (6.25 mg) by mouth 2 times daily 180 tablet 3     famotidine (PEPCID) 20 MG tablet Take 20 mg by mouth daily       lisinopril (ZESTRIL) 20 MG tablet Take 1 tablet (20 mg) by mouth 2 times daily 180 tablet 1     metFORMIN (GLUCOPHAGE) 500 MG tablet Take 500 mg by mouth 2 times daily (with meals)       nitroGLYcerin (NITROSTAT) 0.4 MG sublingual tablet For chest pain place 1 tablet under the tongue every 5 minutes for 3 doses. If symptoms persist 5 minutes after 1st dose call 911. 25 tablet 0           Family History   Problem Relation Age of Onset     Coronary Artery Disease Father      Brain Tumor Father      Diabetes Father      Coronary Artery Disease Paternal Grandmother      Coronary Artery Disease Paternal Grandfather      Parkinsonism Mother      Family History Negative Maternal Grandmother      Family History Negative Maternal Grandfather      Family History Negative Brother      Family History Negative Son      Family History Negative Daughter        Social History     Socioeconomic History     Marital status: "      Spouse name: Not on file     Number of children: Not on file     Years of education: Not on file     Highest education level: Not on file   Occupational History     Not on file   Social Needs     Financial resource strain: Not on file     Food insecurity     Worry: Not on file     Inability: Not on file     Transportation needs     Medical: Not on file     Non-medical: Not on file   Tobacco Use     Smoking status: Never Smoker     Smokeless tobacco: Never Used   Substance and Sexual Activity     Alcohol use: Yes     Comment: rare     Drug use: No     Sexual activity: Not on file   Lifestyle     Physical activity     Days per week: Not on file     Minutes per session: Not on file     Stress: Not on file   Relationships     Social connections     Talks on phone: Not on file     Gets together: Not on file     Attends [employer] service: Not on file     Active member of club or organization: Not on file     Attends meetings of clubs or organizations: Not on file     Relationship status: Not on file     Intimate partner violence     Fear of current or ex partner: Not on file     Emotionally abused: Not on file     Physically abused: Not on file     Forced sexual activity: Not on file   Other Topics Concern     Parent/sibling w/ CABG, MI or angioplasty before 65F 55M? Not Asked   Social History Narrative     Not on file            Past Medical History:     Past Medical History:   Diagnosis Date     Hyperlipidemia      Hypertension      STEMI (ST elevation myocardial infarction) (H)     thrombotitic lesion of RCA -CHELLE               Past Surgical History:     Past Surgical History:   Procedure Laterality Date     HERNIA REPAIR       ORTHOPEDIC SURGERY      acl repair              Allergies:   Patient has no known allergies.       Data:   All laboratory data reviewed:    LAST CHOLESTEROL:  Lab Results   Component Value Date    CHOL 130 03/08/2018     Lab Results   Component Value Date    HDL 35 03/08/2018      Lab Results   Component Value Date    LDL 63 03/08/2018     Lab Results   Component Value Date    TRIG 159 03/08/2018     No results found for: CHOLHDLRATIO    LAST BMP:  Lab Results   Component Value Date     03/15/2018      Lab Results   Component Value Date    POTASSIUM 4.3 03/15/2018     Lab Results   Component Value Date    CHLORIDE 105 03/15/2018     Lab Results   Component Value Date    JACEK 9.1 03/15/2018     Lab Results   Component Value Date    CO2 27 03/15/2018     Lab Results   Component Value Date    BUN 18 03/15/2018     Lab Results   Component Value Date    CR 1.16 03/15/2018     Lab Results   Component Value Date     03/15/2018       LAST CBC:  Lab Results   Component Value Date    WBC 10.9 10/25/2017     Lab Results   Component Value Date    RBC 4.93 10/25/2017     Lab Results   Component Value Date    HGB 14.6 10/25/2017     Lab Results   Component Value Date    HCT 41.8 10/25/2017     Lab Results   Component Value Date    MCV 85 10/25/2017     Lab Results   Component Value Date    MCH 29.6 10/25/2017     Lab Results   Component Value Date    MCHC 34.9 10/25/2017     Lab Results   Component Value Date    RDW 13.5 10/25/2017     Lab Results   Component Value Date     10/25/2017

## 2020-12-27 ENCOUNTER — HEALTH MAINTENANCE LETTER (OUTPATIENT)
Age: 50
End: 2020-12-27

## 2021-08-14 ENCOUNTER — HEALTH MAINTENANCE LETTER (OUTPATIENT)
Age: 51
End: 2021-08-14

## 2021-08-16 ENCOUNTER — OFFICE VISIT (OUTPATIENT)
Dept: CARDIOLOGY | Facility: CLINIC | Age: 51
End: 2021-08-16
Payer: COMMERCIAL

## 2021-08-16 ENCOUNTER — LAB (OUTPATIENT)
Dept: LAB | Facility: CLINIC | Age: 51
End: 2021-08-16
Payer: COMMERCIAL

## 2021-08-16 VITALS
HEART RATE: 59 BPM | SYSTOLIC BLOOD PRESSURE: 133 MMHG | HEIGHT: 68 IN | BODY MASS INDEX: 31.92 KG/M2 | DIASTOLIC BLOOD PRESSURE: 76 MMHG | WEIGHT: 210.6 LBS

## 2021-08-16 DIAGNOSIS — I25.10 CORONARY ARTERY DISEASE INVOLVING NATIVE CORONARY ARTERY OF NATIVE HEART WITHOUT ANGINA PECTORIS: Primary | ICD-10-CM

## 2021-08-16 DIAGNOSIS — I10 ESSENTIAL HYPERTENSION: ICD-10-CM

## 2021-08-16 DIAGNOSIS — E08.00 DIABETES MELLITUS DUE TO UNDERLYING CONDITION WITH HYPEROSMOLARITY WITHOUT COMA, WITHOUT LONG-TERM CURRENT USE OF INSULIN (H): ICD-10-CM

## 2021-08-16 DIAGNOSIS — E78.5 HYPERLIPIDEMIA LDL GOAL <70: ICD-10-CM

## 2021-08-16 LAB
ALT SERPL W P-5'-P-CCNC: 38 U/L (ref 0–70)
ANION GAP SERPL CALCULATED.3IONS-SCNC: <1 MMOL/L (ref 3–14)
BUN SERPL-MCNC: 18 MG/DL (ref 7–30)
CALCIUM SERPL-MCNC: 8.9 MG/DL (ref 8.5–10.1)
CHLORIDE BLD-SCNC: 107 MMOL/L (ref 94–109)
CHOLEST SERPL-MCNC: 140 MG/DL
CO2 SERPL-SCNC: 32 MMOL/L (ref 20–32)
CREAT SERPL-MCNC: 1.07 MG/DL (ref 0.66–1.25)
FASTING STATUS PATIENT QL REPORTED: YES
GFR SERPL CREATININE-BSD FRML MDRD: 79 ML/MIN/1.73M2
GLUCOSE BLD-MCNC: 139 MG/DL (ref 70–99)
HDLC SERPL-MCNC: 38 MG/DL
LDLC SERPL CALC-MCNC: 72 MG/DL
NONHDLC SERPL-MCNC: 102 MG/DL
POTASSIUM BLD-SCNC: 4.6 MMOL/L (ref 3.4–5.3)
SODIUM SERPL-SCNC: 138 MMOL/L (ref 133–144)
TRIGL SERPL-MCNC: 148 MG/DL

## 2021-08-16 PROCEDURE — 99214 OFFICE O/P EST MOD 30 MIN: CPT | Performed by: INTERNAL MEDICINE

## 2021-08-16 PROCEDURE — 36415 COLL VENOUS BLD VENIPUNCTURE: CPT | Performed by: INTERNAL MEDICINE

## 2021-08-16 PROCEDURE — 84460 ALANINE AMINO (ALT) (SGPT): CPT | Performed by: INTERNAL MEDICINE

## 2021-08-16 PROCEDURE — 80061 LIPID PANEL: CPT | Performed by: INTERNAL MEDICINE

## 2021-08-16 PROCEDURE — 80048 BASIC METABOLIC PNL TOTAL CA: CPT | Performed by: INTERNAL MEDICINE

## 2021-08-16 ASSESSMENT — MIFFLIN-ST. JEOR: SCORE: 1774.78

## 2021-08-16 NOTE — PROGRESS NOTES
Cardiology Clinic      Assessment & Plan     1.  Inferior STEMI 2017 with PCI to RCA  2.  Normal stress echocardiogram   3.  Hyperlipidemia  4.  Diabetes mellitus      Recommendations    1.  CAD S/P inferior STEMI 2017: Stable no return of symptoms.  Normal stress test .  Continue with present medical therapy    2.  Hyperlipidemia: We will try a statin holiday to see if his myalgias improve and resolve.  If they do we will likely switch him over to every other day Crestor.    3.  Return to clinic in 1 years time with preclinic lab work          Lashell Farnsworth MD      History of present illness:    51-year-old gentleman presents for a follow-up.  He has a notable history of PCI to RCA in 2017 due to an inferior STEMI.  Mild disease was noted elsewhere.  LV systolic function had good recovery with follow-up MRI and echocardiogram.  I last saw him in 2020.  At that time he had a presentation to emergency room at outside facility leading to a stress test which was within normal limits.  His pain was attributed to noncardiac in etiology.    I had last seen him in 2020.  Since that time overall he has felt well with the exception of proximal muscle weakness and shoulders backs and hips.  He is concerned that the statin could be causing symptoms.  Also on a Saturday note his father passed away after a long hines with brain cancer this year.  He is returned back from [place] from his  this past summer.          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  Prior to admission, he was working about 13-hour days 6-7 days a week, averaging about 30,000 steps a day.  He did this for 1 year.  He is now working less than 50 hours a week.  [place] grew up in [place], moved here when he was 28.  Elana, she works in human rights.  They have 2 children in their 20s      Primary Care Physician   Kedar Peralta      Patient Active Problem List    Diagnosis     STEMI involving right coronary artery (H)     CAD (coronary artery disease)     HTN (hypertension)     Mixed hyperlipidemia     Diabetes mellitus, type 2 (H)       Past Medical History   I have reviewed this patient's medical history and updated it with pertinent information if needed.   Past Medical History:   Diagnosis Date     Hyperlipidemia      Hypertension      STEMI (ST elevation myocardial infarction) (H)     thrombotitic lesion of RCA -CHELLE        Past Surgical History   I have reviewed this patient's surgical history and updated it with pertinent information if needed.  Past Surgical History:   Procedure Laterality Date     HERNIA REPAIR       ORTHOPEDIC SURGERY      acl repair       Prior to Admission Medications   Cannot display prior to admission medications because the patient has not been admitted in this contact.     [unfilled]  [unfilled]  Allergies   No Known Allergies    Social History    reports that he has never smoked. He has never used smokeless tobacco. He reports current alcohol use. He reports that he does not use drugs.    Family History   Family History   Problem Relation Age of Onset     Coronary Artery Disease Father      Brain Tumor Father      Diabetes Father      Coronary Artery Disease Paternal Grandmother      Coronary Artery Disease Paternal Grandfather      Parkinsonism Mother      Family History Negative Maternal Grandmother      Family History Negative Maternal Grandfather      Family History Negative Brother      Family History Negative Son      Family History Negative Daughter        Review of Systems   The comprehensive 10 point Review of Systems is negative other than noted in the HPI or here.     Physical Exam   Vital Signs with Ranges     Wt Readings from Last 4 Encounters:   12/17/20 93 kg (205 lb)   04/03/20 93.9 kg (207 lb)   10/11/18 94.5 kg (208 lb 6.4 oz)   03/15/18 97 kg (213 lb 12.8 oz)     [unfilled]      Vitals: There were no  vitals taken for this visit.

## 2021-08-16 NOTE — LETTER
2021    Erik Haltson, PA [facility] [place] [facility] [address] [place] [address]    RE: Sachin Xavier       Dear Colleague,    I had the pleasure of seeing Sachin Gianna Xavier in the [facility].      Cardiology Clinic      Assessment & Plan     1.  Inferior STEMI 2017 with PCI to RCA  2.  Normal stress echocardiogram   3.  Hyperlipidemia  4.  Diabetes mellitus      Recommendations    1.  CAD S/P inferior STEMI 2017: Stable no return of symptoms.  Normal stress test .  Continue with present medical therapy    2.  Hyperlipidemia: We will try a statin holiday to see if his myalgias improve and resolve.  If they do we will likely switch him over to every other day Crestor.    3.  Return to clinic in 1 years time with preclinic lab work          Lashell Farnsworth MD      History of present illness:    51-year-old gentleman presents for a follow-up.  He has a notable history of PCI to RCA in 2017 due to an inferior STEMI.  Mild disease was noted elsewhere.  LV systolic function had good recovery with follow-up MRI and echocardiogram.  I last saw him in 2020.  At that time he had a presentation to emergency room at outside facility leading to a stress test which was within normal limits.  His pain was attributed to noncardiac in etiology.    I had last seen him in 2020.  Since that time overall he has felt well with the exception of proximal muscle weakness and shoulders backs and hips.  He is concerned that the statin could be causing symptoms.  Also on a Saturday note his father passed away after a long hines with brain cancer this year.  He is returned back from [place] from his  this past summer.          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  Prior to admission, he was working about 13-hour days 6-7 days a week,  averaging about 30,000 steps a day.  He did this for 1 year.  He is now working less than 50 hours a week.  He grew up in [place], moved here when he was 28.  Elana, she works in human rights.  They have 2 children in their 20s      Primary Care Physician   Kedar Peralta      Patient Active Problem List   Diagnosis     STEMI involving right coronary artery (H)     CAD (coronary artery disease)     HTN (hypertension)     Mixed hyperlipidemia     Diabetes mellitus, type 2 (H)       Past Medical History   I have reviewed this patient's medical history and updated it with pertinent information if needed.   Past Medical History:   Diagnosis Date     Hyperlipidemia      Hypertension      STEMI (ST elevation myocardial infarction) (H)     thrombotitic lesion of RCA -CHELLE        Past Surgical History   I have reviewed this patient's surgical history and updated it with pertinent information if needed.  Past Surgical History:   Procedure Laterality Date     HERNIA REPAIR       ORTHOPEDIC SURGERY      acl repair       Prior to Admission Medications   Cannot display prior to admission medications because the patient has not been admitted in this contact.     [unfilled]  [unfilled]  Allergies   No Known Allergies    Social History    reports that he has never smoked. He has never used smokeless tobacco. He reports current alcohol use. He reports that he does not use drugs.    Family History   Family History   Problem Relation Age of Onset     Coronary Artery Disease Father      Brain Tumor Father      Diabetes Father      Coronary Artery Disease Paternal Grandmother      Coronary Artery Disease Paternal Grandfather      Parkinsonism Mother      Family History Negative Maternal Grandmother      Family History Negative Maternal Grandfather      Family History Negative Brother      Family History Negative Son      Family History Negative Daughter        Review of Systems   The comprehensive 10 point Review of Systems  is negative other than noted in the HPI or here.     Physical Exam   Vital Signs with Ranges     Wt Readings from Last 4 Encounters:   12/17/20 93 kg (205 lb)   04/03/20 93.9 kg (207 lb)   10/11/18 94.5 kg (208 lb 6.4 oz)   03/15/18 97 kg (213 lb 12.8 oz)     [unfilled]      Vitals: There were no vitals taken for this visit.               Thank you for allowing me to participate in the care of your patient.      Sincerely,     Lashell Farnsworth MD     [facility]  cc:   Lashell Farnsworth MD  [address] [facility] [address]  [place] [address]

## 2021-08-31 ENCOUNTER — TELEPHONE (OUTPATIENT)
Dept: CARDIOLOGY | Facility: CLINIC | Age: 51
End: 2021-08-31

## 2021-08-31 NOTE — TELEPHONE ENCOUNTER
Sachin called to let us know that his symptoms greatly improved after being off his statin for a week. Per Dr. Farnsworth's note we will now try every other day and see how his symptoms do. He will call us with an update.Annie Mcgregor RN

## 2021-10-01 DIAGNOSIS — I10 ESSENTIAL HYPERTENSION: ICD-10-CM

## 2021-10-01 RX ORDER — LISINOPRIL 20 MG/1
TABLET ORAL
Qty: 180 TABLET | Refills: 1 | OUTPATIENT
Start: 2021-10-01

## 2021-10-01 RX ORDER — LISINOPRIL 20 MG/1
20 TABLET ORAL 2 TIMES DAILY
Qty: 180 TABLET | Refills: 2 | Status: SHIPPED | OUTPATIENT
Start: 2021-10-01

## 2021-10-09 ENCOUNTER — HEALTH MAINTENANCE LETTER (OUTPATIENT)
Age: 51
End: 2021-10-09

## 2022-01-29 ENCOUNTER — HEALTH MAINTENANCE LETTER (OUTPATIENT)
Age: 52
End: 2022-01-29

## 2022-03-26 ENCOUNTER — HEALTH MAINTENANCE LETTER (OUTPATIENT)
Age: 52
End: 2022-03-26

## 2022-09-17 ENCOUNTER — HEALTH MAINTENANCE LETTER (OUTPATIENT)
Age: 52
End: 2022-09-17

## 2022-09-18 ENCOUNTER — MYC REFILL (OUTPATIENT)
Dept: CARDIOLOGY | Facility: CLINIC | Age: 52
End: 2022-09-18

## 2022-09-18 DIAGNOSIS — I21.11 ST ELEVATION MYOCARDIAL INFARCTION INVOLVING RIGHT CORONARY ARTERY (H): ICD-10-CM

## 2022-09-19 RX ORDER — NITROGLYCERIN 0.4 MG/1
TABLET SUBLINGUAL
Qty: 25 TABLET | Refills: 0 | Status: SHIPPED | OUTPATIENT
Start: 2022-09-19

## 2022-09-19 NOTE — TELEPHONE ENCOUNTER
[facility] Cardiology Refill Guideline reviewed. Pt is due for follow up. [employer] message sent to pt. Rx sent to pharmacy.

## 2022-11-07 ENCOUNTER — OFFICE VISIT (OUTPATIENT)
Dept: CARDIOLOGY | Facility: CLINIC | Age: 52
End: 2022-11-07
Payer: COMMERCIAL

## 2022-11-07 VITALS
BODY MASS INDEX: 31.83 KG/M2 | WEIGHT: 210 LBS | HEART RATE: 61 BPM | DIASTOLIC BLOOD PRESSURE: 116 MMHG | HEIGHT: 68 IN | SYSTOLIC BLOOD PRESSURE: 180 MMHG

## 2022-11-07 DIAGNOSIS — I25.10 CORONARY ARTERY DISEASE INVOLVING NATIVE CORONARY ARTERY OF NATIVE HEART WITHOUT ANGINA PECTORIS: ICD-10-CM

## 2022-11-07 PROCEDURE — 99214 OFFICE O/P EST MOD 30 MIN: CPT | Performed by: INTERNAL MEDICINE

## 2022-11-07 NOTE — PROGRESS NOTES
Cardiology Clinic      Assessment & Plan     1.  Inferior STEMI 2017 with PCI to RCA  2.  Normal stress echocardiogram 2020  3.  Hyperlipidemia  4.  Diabetes mellitus      Recommendations    1.  CAD S/P inferior STEMI 2017: Stable no return of symptoms.  Normal stress test 2020.  Continue with present medical therapy    2.  Hyperlipidemia: Numbers are elevated with an LDL of 102 on recent testing.  His prescriber had recommended Zetia which she has not since started.  Given that his triglycerides are elevated I suspect his hemoglobin A1c and poor diet is also contributing.  We have counseled him on watching his diet and trying to lose 10 pounds.  We will try these measures and recheck his cholesterol next year.  Adjustments to his regimen will be made if we are not at goal at that time.      3.  We will have him recheck his blood pressure at home and call us in the next 1 to 2 weeks.  Adjustments will be made based on his home readings.    4.  Return to clinic summer 2023 with pre-clinic echocardiogram.          Lashell Farnsworth MD, MD      History of present illness:    52-year-old gentleman presents for a follow-up.  He has a notable history of PCI to RCA in 2017 due to an inferior STEMI.  Mild disease was noted elsewhere.  LV systolic function had good recovery with follow-up MRI and echocardiogram.  I last saw him in December 2020.  At that time he had a presentation to emergency room at outside facility leading to a stress test which was within normal limits.  His pain was attributed to noncardiac in etiology.    I had last seen him in August 2021.  His father passed away after hines with brain cancer.  We have offered our condolences to him.  Since we last met his diet has not been ideal per patient.  Patient has had elevations in his cholesterol as noted below.  It was recommended that he start Zetia however he has not since started this.  He also hopes to lose 10 pounds.      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  Prior to admission, he was working about 13-hour days 6-7 days a week, averaging about 30,000 steps a day.  He did this for 1 year.  He is now working less than 50 hours a week.  He grew up in [place], moved here when he was 28.  Elana, she works in human rights.  They have 2 children in their 20s      Primary Care Physician   Kedar Peralta      Patient Active Problem List   Diagnosis     STEMI involving right coronary artery (H)     CAD (coronary artery disease)     HTN (hypertension)     Mixed hyperlipidemia     Diabetes mellitus, type 2 (H)       Past Medical History   I have reviewed this patient's medical history and updated it with pertinent information if needed.   Past Medical History:   Diagnosis Date     Hyperlipidemia      Hypertension      STEMI (ST elevation myocardial infarction) (H)     thrombotitic lesion of RCA -CHELLE        Past Surgical History   I have reviewed this patient's surgical history and updated it with pertinent information if needed.  Past Surgical History:   Procedure Laterality Date     HERNIA REPAIR       ORTHOPEDIC SURGERY      acl repair       Prior to Admission Medications   Cannot display prior to admission medications because the patient has not been admitted in this contact.     [unfilled]  [unfilled]  Allergies   No Known Allergies    Social History    reports that he has never smoked. He has never used smokeless tobacco. He reports current alcohol use. He reports that he does not use drugs.    Family History   Family History   Problem Relation Age of Onset     Coronary Artery Disease Father      Brain Tumor Father      Diabetes Father      Coronary Artery Disease Paternal Grandmother      Coronary Artery Disease Paternal Grandfather      Parkinsonism Mother      Family History Negative Maternal  Grandmother      Family History Negative Maternal Grandfather      Family History Negative Brother      Family History Negative Son      Family History Negative Daughter        Review of Systems   The comprehensive 10 point Review of Systems is negative other than noted in the HPI or here.     Physical Exam   Vital Signs with Ranges  BP: ()/()   Arterial Line BP: ()/()   Wt Readings from Last 4 Encounters:   08/16/21 95.5 kg (210 lb 9.6 oz)   12/17/20 93 kg (205 lb)   04/03/20 93.9 kg (207 lb)   10/11/18 94.5 kg (208 lb 6.4 oz)     [unfilled]      Vitals: There were no vitals taken for this visit.

## 2022-11-07 NOTE — LETTER
11/7/2022    Erik Haltson, PA [facility] [address] [place] [address]    RE: Sachin Jamison Duc       Dear Colleague,     I had the pleasure of seeing Sachin Cristalsam Corriganer in the [facility].    Cardiology Clinic      Assessment & Plan     1.  Inferior STEMI 2017 with PCI to RCA  2.  Normal stress echocardiogram 2020  3.  Hyperlipidemia  4.  Diabetes mellitus      Recommendations    1.  CAD S/P inferior STEMI 2017: Stable no return of symptoms.  Normal stress test 2020.  Continue with present medical therapy    2.  Hyperlipidemia: Numbers are elevated with an LDL of 102 on recent testing.  His prescriber had recommended Zetia which she has not since started.  Given that his triglycerides are elevated I suspect his hemoglobin A1c and poor diet is also contributing.  We have counseled him on watching his diet and trying to lose 10 pounds.  We will try these measures and recheck his cholesterol next year.  Adjustments to his regimen will be made if we are not at goal at that time.      3.  We will have him recheck his blood pressure at home and call us in the next 1 to 2 weeks.  Adjustments will be made based on his home readings.    4.  Return to clinic summer 2023 with pre-clinic echocardiogram.          Lashell Farnsworth MD, MD      History of present illness:    52-year-old gentleman presents for a follow-up.  He has a notable history of PCI to RCA in 2017 due to an inferior STEMI.  Mild disease was noted elsewhere.  LV systolic function had good recovery with follow-up MRI and echocardiogram.  I last saw him in December 2020.  At that time he had a presentation to emergency room at outside facility leading to a stress test which was within normal limits.  His pain was attributed to noncardiac in etiology.    I had last seen him in August 2021.  His father passed away after hines with brain cancer.  We have offered our condolences  to him.  Since we last met his diet has not been ideal per patient.  Patient has had elevations in his cholesterol as noted below.  It was recommended that he start Zetia however he has not since started this.  He also hopes to lose 10 pounds.      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  Prior to admission, he was working about 13-hour days 6-7 days a week, averaging about 30,000 steps a day.  He did this for 1 year.  He is now working less than 50 hours a week.  He grew up in [place], moved here when he was 28.  Elana, she works in human rights.  They have 2 children in their 20s      Primary Care Physician   Kedar Peralta      Patient Active Problem List   Diagnosis     STEMI involving right coronary artery (H)     CAD (coronary artery disease)     HTN (hypertension)     Mixed hyperlipidemia     Diabetes mellitus, type 2 (H)       Past Medical History   I have reviewed this patient's medical history and updated it with pertinent information if needed.   Past Medical History:   Diagnosis Date     Hyperlipidemia      Hypertension      STEMI (ST elevation myocardial infarction) (H)     thrombotitic lesion of RCA -CHELLE        Past Surgical History   I have reviewed this patient's surgical history and updated it with pertinent information if needed.  Past Surgical History:   Procedure Laterality Date     HERNIA REPAIR       ORTHOPEDIC SURGERY      acl repair       Prior to Admission Medications   Cannot display prior to admission medications because the patient has not been admitted in this contact.     [unfilled]  [unfilled]  Allergies   No Known Allergies    Social History    reports that he has never smoked. He has never used smokeless tobacco. He reports current alcohol use. He reports that he does not use drugs.    Family History   Family History   Problem Relation  Age of Onset     Coronary Artery Disease Father      Brain Tumor Father      Diabetes Father      Coronary Artery Disease Paternal Grandmother      Coronary Artery Disease Paternal Grandfather      Parkinsonism Mother      Family History Negative Maternal Grandmother      Family History Negative Maternal Grandfather      Family History Negative Brother      Family History Negative Son      Family History Negative Daughter        Review of Systems   The comprehensive 10 point Review of Systems is negative other than noted in the HPI or here.     Physical Exam   Vital Signs with Ranges  BP: ()/()   Arterial Line BP: ()/()   Wt Readings from Last 4 Encounters:   08/16/21 95.5 kg (210 lb 9.6 oz)   12/17/20 93 kg (205 lb)   04/03/20 93.9 kg (207 lb)   10/11/18 94.5 kg (208 lb 6.4 oz)     [unfilled]      Vitals: There were no vitals taken for this visit.       Thank you for allowing me to participate in the care of your patient.      Sincerely,     Lashell Farnsworth MD     [facility]  cc:   Referred Self,

## 2022-11-07 NOTE — PATIENT INSTRUCTIONS
Your blood pressure was elevated at your appointment today.  Elevated blood pressure can increase your risk of a heart attack, stroke and heart failure.  For this reason, we feel it is important to monitor your blood pressure closely.  If you have access to a home blood pressure monitor or are able to check your blood pressure at a local pharmacy in the next week we would like you to do so and call our clinic with those readings. Please call [phone] (Tracee) and leave a message with your name, date of birth, blood pressure reading, date and location it was completed. If your blood pressure remains elevated your care team will be notified.  We appreciate being a part of your healthcare team and look forward to hearing from you soon.

## 2023-01-23 ENCOUNTER — HEALTH MAINTENANCE LETTER (OUTPATIENT)
Age: 53
End: 2023-01-23

## 2023-03-14 ENCOUNTER — APPOINTMENT (OUTPATIENT)
Dept: GENERAL RADIOLOGY | Facility: CLINIC | Age: 53
End: 2023-03-14
Attending: EMERGENCY MEDICINE
Payer: COMMERCIAL

## 2023-03-14 ENCOUNTER — HOSPITAL ENCOUNTER (EMERGENCY)
Facility: CLINIC | Age: 53
Discharge: HOME | End: 2023-03-14
Attending: EMERGENCY MEDICINE | Admitting: EMERGENCY MEDICINE
Payer: COMMERCIAL

## 2023-03-14 VITALS
HEART RATE: 64 BPM | SYSTOLIC BLOOD PRESSURE: 153 MMHG | HEIGHT: 68 IN | OXYGEN SATURATION: 95 % | BODY MASS INDEX: 31.22 KG/M2 | TEMPERATURE: 97.5 F | DIASTOLIC BLOOD PRESSURE: 95 MMHG | RESPIRATION RATE: 13 BRPM | WEIGHT: 206 LBS

## 2023-03-14 DIAGNOSIS — R07.9 CHEST PAIN, UNSPECIFIED TYPE: ICD-10-CM

## 2023-03-14 LAB
ALBUMIN SERPL BCG-MCNC: 4.1 G/DL (ref 3.5–5.2)
ALP SERPL-CCNC: 78 U/L (ref 40–129)
ALT SERPL W P-5'-P-CCNC: 26 U/L (ref 10–50)
ANION GAP SERPL CALCULATED.3IONS-SCNC: 8 MMOL/L (ref 7–15)
AST SERPL W P-5'-P-CCNC: 18 U/L (ref 10–50)
ATRIAL RATE - MUSE: 63 BPM
ATRIAL RATE - MUSE: 64 BPM
BASOPHILS # BLD AUTO: 0.1 10E3/UL (ref 0–0.2)
BASOPHILS NFR BLD AUTO: 1 %
BILIRUB SERPL-MCNC: 0.2 MG/DL
BUN SERPL-MCNC: 14 MG/DL (ref 6–20)
CALCIUM SERPL-MCNC: 9.2 MG/DL (ref 8.6–10)
CHLORIDE SERPL-SCNC: 102 MMOL/L (ref 98–107)
CREAT SERPL-MCNC: 0.82 MG/DL (ref 0.67–1.17)
D DIMER PPP FEU-MCNC: <0.27 UG/ML FEU (ref 0–0.5)
DEPRECATED HCO3 PLAS-SCNC: 28 MMOL/L (ref 22–29)
DIASTOLIC BLOOD PRESSURE - MUSE: NORMAL MMHG
DIASTOLIC BLOOD PRESSURE - MUSE: NORMAL MMHG
EOSINOPHIL # BLD AUTO: 0.1 10E3/UL (ref 0–0.7)
EOSINOPHIL NFR BLD AUTO: 1 %
ERYTHROCYTE [DISTWIDTH] IN BLOOD BY AUTOMATED COUNT: 12.7 % (ref 10–15)
GFR SERPL CREATININE-BSD FRML MDRD: >90 ML/MIN/1.73M2
GLUCOSE SERPL-MCNC: 176 MG/DL (ref 70–99)
HCT VFR BLD AUTO: 43.6 % (ref 40–53)
HGB BLD-MCNC: 14.6 G/DL (ref 13.3–17.7)
IMM GRANULOCYTES # BLD: 0 10E3/UL
IMM GRANULOCYTES NFR BLD: 0 %
INTERPRETATION ECG - MUSE: NORMAL
INTERPRETATION ECG - MUSE: NORMAL
LIPASE SERPL-CCNC: 37 U/L (ref 13–60)
LYMPHOCYTES # BLD AUTO: 2 10E3/UL (ref 0.8–5.3)
LYMPHOCYTES NFR BLD AUTO: 28 %
MCH RBC QN AUTO: 29 PG (ref 26.5–33)
MCHC RBC AUTO-ENTMCNC: 33.5 G/DL (ref 31.5–36.5)
MCV RBC AUTO: 87 FL (ref 78–100)
MONOCYTES # BLD AUTO: 0.5 10E3/UL (ref 0–1.3)
MONOCYTES NFR BLD AUTO: 7 %
NEUTROPHILS # BLD AUTO: 4.6 10E3/UL (ref 1.6–8.3)
NEUTROPHILS NFR BLD AUTO: 63 %
NRBC # BLD AUTO: 0 10E3/UL
NRBC BLD AUTO-RTO: 0 /100
P AXIS - MUSE: 0 DEGREES
P AXIS - MUSE: 32 DEGREES
PLATELET # BLD AUTO: 314 10E3/UL (ref 150–450)
POTASSIUM SERPL-SCNC: 4 MMOL/L (ref 3.4–5.3)
PR INTERVAL - MUSE: 162 MS
PR INTERVAL - MUSE: 202 MS
PROT SERPL-MCNC: 6.8 G/DL (ref 6.4–8.3)
QRS DURATION - MUSE: 80 MS
QRS DURATION - MUSE: 90 MS
QT - MUSE: 382 MS
QT - MUSE: 388 MS
QTC - MUSE: 390 MS
QTC - MUSE: 400 MS
R AXIS - MUSE: 10 DEGREES
R AXIS - MUSE: 44 DEGREES
RBC # BLD AUTO: 5.04 10E6/UL (ref 4.4–5.9)
SODIUM SERPL-SCNC: 138 MMOL/L (ref 136–145)
SYSTOLIC BLOOD PRESSURE - MUSE: NORMAL MMHG
SYSTOLIC BLOOD PRESSURE - MUSE: NORMAL MMHG
T AXIS - MUSE: 143 DEGREES
T AXIS - MUSE: 228 DEGREES
TROPONIN T SERPL HS-MCNC: <6 NG/L
VENTRICULAR RATE- MUSE: 63 BPM
VENTRICULAR RATE- MUSE: 64 BPM
WBC # BLD AUTO: 7.3 10E3/UL (ref 4–11)

## 2023-03-14 PROCEDURE — 71046 X-RAY EXAM CHEST 2 VIEWS: CPT

## 2023-03-14 PROCEDURE — 83690 ASSAY OF LIPASE: CPT | Performed by: EMERGENCY MEDICINE

## 2023-03-14 PROCEDURE — 80053 COMPREHEN METABOLIC PANEL: CPT | Performed by: EMERGENCY MEDICINE

## 2023-03-14 PROCEDURE — 36415 COLL VENOUS BLD VENIPUNCTURE: CPT | Performed by: EMERGENCY MEDICINE

## 2023-03-14 PROCEDURE — 85025 COMPLETE CBC W/AUTO DIFF WBC: CPT | Performed by: EMERGENCY MEDICINE

## 2023-03-14 PROCEDURE — 93005 ELECTROCARDIOGRAM TRACING: CPT

## 2023-03-14 PROCEDURE — 99285 EMERGENCY DEPT VISIT HI MDM: CPT | Mod: 25

## 2023-03-14 PROCEDURE — 93005 ELECTROCARDIOGRAM TRACING: CPT | Mod: 76

## 2023-03-14 PROCEDURE — 85379 FIBRIN DEGRADATION QUANT: CPT | Performed by: EMERGENCY MEDICINE

## 2023-03-14 PROCEDURE — 84484 ASSAY OF TROPONIN QUANT: CPT | Performed by: EMERGENCY MEDICINE

## 2023-03-14 ASSESSMENT — ACTIVITIES OF DAILY LIVING (ADL): ADLS_ACUITY_SCORE: 35

## 2023-03-14 ASSESSMENT — ENCOUNTER SYMPTOMS
DIAPHORESIS: 0
VOMITING: 0
NAUSEA: 1

## 2023-03-14 NOTE — ED PROVIDER NOTES
History   Chief Complaint:  Chest Pain     The history is provided by the patient.      Sachin Xavier is a 53 year old male with a history of CAD s/p stent in 2017 on ASA and Plavix presenting with chest pain. He describes 3-4 days of intermittent chest pain that radiates to his back and down both arms with tingling in his fingers. His pain worsened today with BP of 187/101 so he went to Urgent Care as he was concerned he was having a heart attack.  He did not try nitroglycerin but Urgent Care called EMS and administered aspirin and nitroglycerin with resolution and no return of pain. He also complains of nausea without vomiting and abdominal distention. He felt his pain was worse with eating but not with exertion. Typically when he has these episodes, he will lie down and relax with symptom resolution in 5-10 minutes. He denies diaphoresis or leg swelling. Sachin had recent travel to [place].    Independent Historian:   None - Patient Only    Review of External Notes: I reviewed patient's EKG from clinic today. I reviewed patient's normal stress echocardiogram from 2021.    ROS:  Review of Systems   Constitutional: Negative for diaphoresis.   Cardiovascular: Positive for chest pain. Negative for leg swelling.   Gastrointestinal: Positive for abdominal distention and nausea. Negative for vomiting.   All other systems reviewed and are negative.    Allergies:  No Known Allergies     Medications:    Aspirin 81 mg   Atorvastatin  Carvedilol  Famotidine  Lisinopril  Metformin  Nitroglycerin  Plavix   Flonase  Ezetimibe     Past Medical History:    Hyperlipidemia  Hypertension  STEMI  BPH  CAD  Intermittent paresthesias of hand and foot  Type 2 diabetes   Obese     Past Surgical History:    Umbilical hernia repair  L4-5 chemical disc reduction  Vasectomy    Left ACL reconstruction     Family History:    Father - brain tumor, CAD, type 2 diabetes, heart attack, hyperlipidemia, hypertension  Mother - Parkinsonism,  "hypertension, glaucoma    Social History:  Presents with children  Works as   Nonsmoker   Physical Exam     Patient Vitals for the past 24 hrs:   BP Temp Temp src Pulse Resp SpO2 Height Weight   03/14/23 2005 (!) 153/95 -- -- 64 13 95 % -- --   03/14/23 1929 (!) 145/91 -- -- 73 15 97 % -- --   03/14/23 1900 (!) 146/88 -- -- 65 -- 97 % -- --   03/14/23 1759 (!) 135/90 -- -- 69 18 97 % -- --   03/14/23 1718 (!) 150/90 97.5  F (36.4  C) Temporal 70 16 97 % 1.727 m (5' 8\") 93.4 kg (206 lb)        Physical Exam  General: Well-developed and well-nourished. Well appearing middle aged  man. Cooperative.  Head:  Atraumatic.  Eyes:  Conjunctivae, lids, and sclerae are normal.  Neck:  Supple. Normal range of motion.  CV:  Regular rate and rhythm. Normal heart sounds with no murmurs, rubs, or gallops detected.  Resp:  No respiratory distress. Clear to auscultation bilaterally without decreased breath sounds, wheezing, rales, or rhonchi.  GI:  Soft. Non-distended. Non-tender.    MS:  Normal ROM. No bilateral lower extremity edema.  Skin:  Warm. Non-diaphoretic. No pallor.  Neuro: Awake. A&Ox3. Normal strength.  Psych:  Normal mood and affect. Normal speech.  Vitals reviewed.    Emergency Department Course   EKG #1  Indication: Chest Pain  Time: 1728  Rate 64 bpm. VA interval 162. QRS duration 80. QT/QTc 388/400.   NSR  ST&T wave abnormality, consider inferior ischemia  ST&T wave abnormality, consider anterolateral ischemia   No acute ST changes.  T wave inversion, ST depression, anterolateral more prominant as compared to prior, dated 03/08/18.    EKG #2  Indication: Chest Pain  Time: 1932  Rate 63 bpm. VA interval 202. QRS duration 90. QT/QTc 382/390.   NSR  T wave abnormality, consider inferolateral ischemia   No acute ST changes.  No change as compared to prior, dated today 03/14/23 @ 1728.    Imaging:  XR Chest 2 Views   Final Result   IMPRESSION: Negative chest.      Echo Stress Echocardiogram    (Results " Pending)      Report per radiology    Laboratory:  Labs Ordered and Resulted from Time of ED Arrival to Time of ED Departure   COMPREHENSIVE METABOLIC PANEL - Abnormal       Result Value    Sodium 138      Potassium 4.0      Chloride 102      Carbon Dioxide (CO2) 28      Anion Gap 8      Urea Nitrogen 14.0      Creatinine 0.82      Calcium 9.2      Glucose 176 (*)     Alkaline Phosphatase 78      AST 18      ALT 26      Protein Total 6.8      Albumin 4.1      Bilirubin Total 0.2      GFR Estimate >90     D DIMER QUANTITATIVE - Normal    D-Dimer Quantitative <0.27     LIPASE - Normal    Lipase 37     TROPONIN T, HIGH SENSITIVITY - Normal    Troponin T, High Sensitivity <6     CBC WITH PLATELETS AND DIFFERENTIAL    WBC Count 7.3      RBC Count 5.04      Hemoglobin 14.6      Hematocrit 43.6      MCV 87      MCH 29.0      MCHC 33.5      RDW 12.7      Platelet Count 314      % Neutrophils 63      % Lymphocytes 28      % Monocytes 7      % Eosinophils 1      % Basophils 1      % Immature Granulocytes 0      NRBCs per 100 WBC 0      Absolute Neutrophils 4.6      Absolute Lymphocytes 2.0      Absolute Monocytes 0.5      Absolute Eosinophils 0.1      Absolute Basophils 0.1      Absolute Immature Granulocytes 0.0      Absolute NRBCs 0.0       Emergency Department Course & Assessments:  Assessments:  1739 I obtained history and examined the patient, as above.  2007 I rechecked the patient and updated him on findings. Amenable to discharge.     Independent Interpretation (X-rays, CTs, rhythm strip):  I reviewed patient's chest xray. I agree with radiology.    Social Determinants of Health affecting care:   Employed, Supportive Family    Disposition:  The patient was discharged.     Impression & Plan    Medical Decision Making:  Sachin is a 53 year old man with history of MI presenting with 3 to 4 days of intermittent chest pain that was resolved with nitroglycerin prior to arrival.  However, it has not been exertional and  actually seems worse after eating.  He also has nausea and bloating.  On exam he appears well with moderate hypertension.    EKG does reveal T wave inversions and ST depressions which are somewhat more prominent as compared to 2018 but not dynamic today on recheck.  Further, troponin is undetectable. I favor a non-cardiac etiology for his pain.  D-dimer is also undetectable essentially ruling out pulmonary embolism.  Chest x-ray does not reveal alternate cause for his pain such as pneumonia or pneumothorax.  There is no kidney injury, electrolyte derangement, hepatitis, biliary obstruction, leukocytosis, anemia, or pancreatitis.    He had no recurrence of chest pain in the emergency department and is appropriate for discharge.  However, given his history he warrants outpatient stress testing (last was normal in 2021) and he is agreeable to this plan.  Indications for return to the emergency department in the interim were discussed in detail.  I counseled him on the importance of following up for not only the stress test but also with his cardiologist.  He verbalized understanding and all questions were answered.    Diagnosis:    ICD-10-CM    1. Chest pain, unspecified type  R07.9 Echo Stress Echocardiogram           Scribe Disclosure:  I, Abbie Alfonso, am serving as a scribe at 6:14 PM on 3/14/2023 to document services personally performed by Deann Odom MD based on my observations and the provider's statements to me.     3/14/2023   Deann Odom MD Dixson, Kylie S, MD  04/03/23 0906

## 2023-03-14 NOTE — ED NOTES
Rapid Assessment Note    History:   Sachin is a 53 year old man with history of CAD s/p stent 2017 on ASA and Plavix presenting with 3-4 days of intermittent chest pain that radiates to his back and down both arms with tingling in his fingers. His pain worsened today with BP of  187/101 so he went to Urgent care as he was concerned he was having a heart attack. He did not try nitroglycerin. He also complains of nausea and abdominal distention. He felt his pain is worse with eating but not with exertion. He denies diaphoresis and vomiting. He denies leg swelling, but has neuropathy. Sachin had recent travel to [place].     Exam:   General:  Alert, interactive  Cardiovascular:  Well perfused  Lungs:  No respiratory distress, no accessory muscle use  Neuro:  Moving all 4 extremities  Extremities: No pitting edema or calf tenderness bilaterally  Skin:  Warm, dry  Psych:  Normal affect    Plan of Care:   I evaluated the patient and developed an initial plan of care. I discussed this plan and explained that I, or one of my partners, would be returning to complete the evaluation.     This is a 53-year-old male with a history of coronary artery disease presenting with 3 to 4 days of intermittent chest pain that worsened today.  He did not try nitroglycerin.  He also has nausea and abdominal distention.  Recent travel.  EKG, dimer, belly labs ordered.    I, Abbie Alfonso, am serving as a scribe to document services personally performed by Deann Odom MD, based on my observations and the provider's statements to me.    03/14/23  [employer]    Portions of this medical record were completed by a scribe. UPON MY REVIEW AND AUTHENTICATION BY ELECTRONIC SIGNATURE, this confirms (a) I performed the applicable clinical services, and (b) the record is accurate.      Deann Odom MD  03/14/23 5039

## 2023-03-14 NOTE — ED TRIAGE NOTES
Patient here by EMS. 3-4 days of CP. Went to his clinic today and they sent him in. Patient given 324 aspirin and 1 nitroglycerin PTA. Nitroglycerin took pain to a 0. A&Ox4     Triage Assessment     Row Name 03/14/23 2941       Triage Assessment (Adult)    Airway WDL WDL       Respiratory WDL    Respiratory WDL WDL       Skin Circulation/Temperature WDL    Skin Circulation/Temperature WDL WDL       Cardiac WDL    Cardiac WDL X;chest pain       Peripheral/Neurovascular WDL    Peripheral Neurovascular WDL WDL       Cognitive/Neuro/Behavioral WDL    Cognitive/Neuro/Behavioral WDL WDL

## 2023-03-15 NOTE — DISCHARGE INSTRUCTIONS
You will be called to arrange an outpatient stress test.  However, if you have recurrence of pain that is requiring more than 1 dose of nitroglycerin or more than 1 dose of nitroglycerin in a single day, you need to return immediately to the emergency department.  You should also return if you are having changing of your pain or pain that is associated with new symptoms such as sweating, vomiting, or shortness of breath.  It is imperative that you have this stress test as soon as possible and see your cardiologist.  Please call to arrange this appointment first thing tomorrow.

## 2023-03-29 ENCOUNTER — HOSPITAL ENCOUNTER (OUTPATIENT)
Dept: CARDIOLOGY | Facility: CLINIC | Age: 53
Discharge: HOME | End: 2023-03-29
Attending: EMERGENCY MEDICINE | Admitting: EMERGENCY MEDICINE
Payer: COMMERCIAL

## 2023-03-29 DIAGNOSIS — R07.9 CHEST PAIN, UNSPECIFIED TYPE: ICD-10-CM

## 2023-03-29 PROCEDURE — 93325 DOPPLER ECHO COLOR FLOW MAPG: CPT | Mod: 26 | Performed by: INTERNAL MEDICINE

## 2023-03-29 PROCEDURE — 93016 CV STRESS TEST SUPVJ ONLY: CPT | Performed by: INTERNAL MEDICINE

## 2023-03-29 PROCEDURE — 93018 CV STRESS TEST I&R ONLY: CPT | Performed by: INTERNAL MEDICINE

## 2023-03-29 PROCEDURE — 93321 DOPPLER ECHO F-UP/LMTD STD: CPT | Mod: 26 | Performed by: INTERNAL MEDICINE

## 2023-03-29 PROCEDURE — 93350 STRESS TTE ONLY: CPT | Mod: 26 | Performed by: INTERNAL MEDICINE

## 2023-03-29 PROCEDURE — 93325 DOPPLER ECHO COLOR FLOW MAPG: CPT | Mod: TC

## 2023-03-29 PROCEDURE — 93321 DOPPLER ECHO F-UP/LMTD STD: CPT | Mod: TC

## 2023-04-03 ASSESSMENT — ENCOUNTER SYMPTOMS: ABDOMINAL DISTENTION: 1

## 2023-07-29 ENCOUNTER — HEALTH MAINTENANCE LETTER (OUTPATIENT)
Age: 53
End: 2023-07-29

## 2023-10-07 ENCOUNTER — HEALTH MAINTENANCE LETTER (OUTPATIENT)
Age: 53
End: 2023-10-07

## 2024-02-24 ENCOUNTER — HEALTH MAINTENANCE LETTER (OUTPATIENT)
Age: 54
End: 2024-02-24

## 2024-07-24 ENCOUNTER — HOSPITAL ENCOUNTER (EMERGENCY)
Facility: CLINIC | Age: 54
Discharge: HOME | End: 2024-07-25
Attending: EMERGENCY MEDICINE | Admitting: EMERGENCY MEDICINE
Payer: COMMERCIAL

## 2024-07-24 DIAGNOSIS — R10.13 EPIGASTRIC PAIN: ICD-10-CM

## 2024-07-24 LAB
ALBUMIN SERPL BCG-MCNC: 4.3 G/DL (ref 3.5–5.2)
ALP SERPL-CCNC: 87 U/L (ref 40–150)
ALT SERPL W P-5'-P-CCNC: 31 U/L (ref 0–70)
ANION GAP SERPL CALCULATED.3IONS-SCNC: 9 MMOL/L (ref 7–15)
AST SERPL W P-5'-P-CCNC: 19 U/L (ref 0–45)
BASOPHILS # BLD AUTO: 0.1 10E3/UL (ref 0–0.2)
BASOPHILS NFR BLD AUTO: 1 %
BILIRUB SERPL-MCNC: 0.2 MG/DL
BUN SERPL-MCNC: 17.3 MG/DL (ref 6–20)
CALCIUM SERPL-MCNC: 9.5 MG/DL (ref 8.8–10.4)
CHLORIDE SERPL-SCNC: 97 MMOL/L (ref 98–107)
CREAT SERPL-MCNC: 1.17 MG/DL (ref 0.67–1.17)
EGFRCR SERPLBLD CKD-EPI 2021: 73 ML/MIN/1.73M2
EOSINOPHIL # BLD AUTO: 0.1 10E3/UL (ref 0–0.7)
EOSINOPHIL NFR BLD AUTO: 2 %
ERYTHROCYTE [DISTWIDTH] IN BLOOD BY AUTOMATED COUNT: 12.4 % (ref 10–15)
GLUCOSE BLDC GLUCOMTR-MCNC: 260 MG/DL (ref 70–99)
GLUCOSE SERPL-MCNC: 289 MG/DL (ref 70–99)
HCO3 SERPL-SCNC: 30 MMOL/L (ref 22–29)
HCT VFR BLD AUTO: 40.7 % (ref 40–53)
HGB BLD-MCNC: 14.2 G/DL (ref 13.3–17.7)
HOLD SPECIMEN: NORMAL
IMM GRANULOCYTES # BLD: 0 10E3/UL
IMM GRANULOCYTES NFR BLD: 0 %
LYMPHOCYTES # BLD AUTO: 2.3 10E3/UL (ref 0.8–5.3)
LYMPHOCYTES NFR BLD AUTO: 24 %
MCH RBC QN AUTO: 30.1 PG (ref 26.5–33)
MCHC RBC AUTO-ENTMCNC: 34.9 G/DL (ref 31.5–36.5)
MCV RBC AUTO: 86 FL (ref 78–100)
MONOCYTES # BLD AUTO: 0.8 10E3/UL (ref 0–1.3)
MONOCYTES NFR BLD AUTO: 8 %
NEUTROPHILS # BLD AUTO: 6.3 10E3/UL (ref 1.6–8.3)
NEUTROPHILS NFR BLD AUTO: 66 %
NRBC # BLD AUTO: 0 10E3/UL
NRBC BLD AUTO-RTO: 0 /100
PLATELET # BLD AUTO: 271 10E3/UL (ref 150–450)
POTASSIUM SERPL-SCNC: 4.3 MMOL/L (ref 3.4–5.3)
PROT SERPL-MCNC: 6.9 G/DL (ref 6.4–8.3)
RBC # BLD AUTO: 4.72 10E6/UL (ref 4.4–5.9)
SODIUM SERPL-SCNC: 136 MMOL/L (ref 135–145)
TROPONIN T SERPL HS-MCNC: 8 NG/L
WBC # BLD AUTO: 9.6 10E3/UL (ref 4–11)

## 2024-07-24 PROCEDURE — 84484 ASSAY OF TROPONIN QUANT: CPT | Performed by: EMERGENCY MEDICINE

## 2024-07-24 PROCEDURE — 250N000011 HC RX IP 250 OP 636: Performed by: EMERGENCY MEDICINE

## 2024-07-24 PROCEDURE — 93005 ELECTROCARDIOGRAM TRACING: CPT

## 2024-07-24 PROCEDURE — 80053 COMPREHEN METABOLIC PANEL: CPT | Performed by: EMERGENCY MEDICINE

## 2024-07-24 PROCEDURE — 36415 COLL VENOUS BLD VENIPUNCTURE: CPT | Performed by: EMERGENCY MEDICINE

## 2024-07-24 PROCEDURE — 85025 COMPLETE CBC W/AUTO DIFF WBC: CPT | Performed by: EMERGENCY MEDICINE

## 2024-07-24 PROCEDURE — 96374 THER/PROPH/DIAG INJ IV PUSH: CPT

## 2024-07-24 PROCEDURE — 99285 EMERGENCY DEPT VISIT HI MDM: CPT | Mod: 25

## 2024-07-24 PROCEDURE — 82962 GLUCOSE BLOOD TEST: CPT

## 2024-07-24 RX ORDER — ONDANSETRON 2 MG/ML
4 INJECTION INTRAMUSCULAR; INTRAVENOUS ONCE
Status: COMPLETED | OUTPATIENT
Start: 2024-07-24 | End: 2024-07-24

## 2024-07-24 RX ADMIN — ONDANSETRON 4 MG: 2 INJECTION INTRAMUSCULAR; INTRAVENOUS at 22:06

## 2024-07-24 ASSESSMENT — COLUMBIA-SUICIDE SEVERITY RATING SCALE - C-SSRS
1. IN THE PAST MONTH, HAVE YOU WISHED YOU WERE DEAD OR WISHED YOU COULD GO TO SLEEP AND NOT WAKE UP?: NO
6. HAVE YOU EVER DONE ANYTHING, STARTED TO DO ANYTHING, OR PREPARED TO DO ANYTHING TO END YOUR LIFE?: NO
2. HAVE YOU ACTUALLY HAD ANY THOUGHTS OF KILLING YOURSELF IN THE PAST MONTH?: NO

## 2024-07-24 ASSESSMENT — ACTIVITIES OF DAILY LIVING (ADL)
ADLS_ACUITY_SCORE: 35
ADLS_ACUITY_SCORE: 35

## 2024-07-25 ENCOUNTER — APPOINTMENT (OUTPATIENT)
Dept: GENERAL RADIOLOGY | Facility: CLINIC | Age: 54
End: 2024-07-25
Attending: EMERGENCY MEDICINE
Payer: COMMERCIAL

## 2024-07-25 VITALS
OXYGEN SATURATION: 96 % | TEMPERATURE: 98.2 F | BODY MASS INDEX: 31.93 KG/M2 | RESPIRATION RATE: 16 BRPM | SYSTOLIC BLOOD PRESSURE: 143 MMHG | WEIGHT: 210 LBS | DIASTOLIC BLOOD PRESSURE: 94 MMHG | HEART RATE: 68 BPM

## 2024-07-25 LAB
ATRIAL RATE - MUSE: 79 BPM
DIASTOLIC BLOOD PRESSURE - MUSE: NORMAL MMHG
INTERPRETATION ECG - MUSE: NORMAL
P AXIS - MUSE: 43 DEGREES
PR INTERVAL - MUSE: 184 MS
QRS DURATION - MUSE: 92 MS
QT - MUSE: 390 MS
QTC - MUSE: 447 MS
R AXIS - MUSE: 59 DEGREES
SYSTOLIC BLOOD PRESSURE - MUSE: NORMAL MMHG
T AXIS - MUSE: 40 DEGREES
TROPONIN T SERPL HS-MCNC: 11 NG/L
VENTRICULAR RATE- MUSE: 79 BPM

## 2024-07-25 PROCEDURE — 84484 ASSAY OF TROPONIN QUANT: CPT | Performed by: EMERGENCY MEDICINE

## 2024-07-25 PROCEDURE — 71046 X-RAY EXAM CHEST 2 VIEWS: CPT

## 2024-07-25 PROCEDURE — 36415 COLL VENOUS BLD VENIPUNCTURE: CPT | Performed by: EMERGENCY MEDICINE

## 2024-07-25 ASSESSMENT — ACTIVITIES OF DAILY LIVING (ADL)
ADLS_ACUITY_SCORE: 35

## 2024-07-25 NOTE — ED PROVIDER NOTES
Emergency Department Note      History of Present Illness     Chief Complaint   Chest Pain    HPI   Sachin Xavier is a 54 year old male with history of prior STEMI who presents to the emergency department complaining of epigastric pain.  The patient states that he had started playing soccer, which she does frequently, and he developed epigastric pain and noted that he was sweating slightly.  When he had his prior MI he had severe pain that radiated into the left arm.  His pain today does not radiate and is much less severe.  He also notes that the quality of the discomfort is different as well as the intensity.  He stated it was very hot and humid and he believes this was why he was sweating but he is not sure.  He noted that other players were sweaty also but that he was playing [employer] and was not as active as some of the other players.  He denies shortness of breath.  Denies having similar symptoms with activity before.  He notes he had a stress test 1 year ago which was normal.  He is appropriately treating his hyperlipidemia and hypertension.  He he has no other complaints.    Independent Historian   Wife as detailed above.    Review of External Notes   Reviewed recent stress test -normal stress test    Past Medical History     Medical History and Problem List   Past Medical History:   Diagnosis Date    Hyperlipidemia     Hypertension     STEMI (ST elevation myocardial infarction) (H)        Medications   aspirin EC 81 MG EC tablet  atorvastatin (LIPITOR) 80 MG tablet  carvedilol (COREG) 6.25 MG tablet  famotidine (PEPCID) 20 MG tablet  lisinopril (ZESTRIL) 20 MG tablet  metFORMIN (GLUCOPHAGE) 500 MG tablet  nitroGLYcerin (NITROSTAT) 0.4 MG sublingual tablet        Surgical History   Past Surgical History:   Procedure Laterality Date    HERNIA REPAIR      ORTHOPEDIC SURGERY      acl repair       Physical Exam     Patient Vitals for the past 24 hrs:   BP Temp Temp src Pulse Resp SpO2 Weight   07/25/24 0055  (!) 143/94 -- -- 68 -- 96 % --   07/24/24 2152 (!) 180/99 98.2  F (36.8  C) Oral 81 16 94 % 95.3 kg (210 lb)     Physical Exam  General: Resting on the gurney, appears comfortable  Head:  The scalp, face, and head appear normal  Mouth/Throat: Mucus membranes are moist  CV:  Regular rate    Normal S1 and S2  No pathological murmur   Resp:  Breath sounds clear and equal bilaterally    Non-labored, no retractions or accessory muscle use    No coarseness    No wheezing   GI:  Abdomen is soft, no rigidity    No tenderness to palpation  MS:  Normal motor assessment of all extremities.    Good capillary refill noted.  Skin:  No rash or lesions noted.  Neuro:   Speech is normal and fluent. No apparent deficit.  Psych: Awake. Alert.  Normal affect.      Appropriate interactions.      Diagnostics     Lab Results   Labs Ordered and Resulted from Time of ED Arrival to Time of ED Departure   COMPREHENSIVE METABOLIC PANEL - Abnormal       Result Value    Sodium 136      Potassium 4.3      Carbon Dioxide (CO2) 30 (*)     Anion Gap 9      Urea Nitrogen 17.3      Creatinine 1.17      GFR Estimate 73      Calcium 9.5      Chloride 97 (*)     Glucose 289 (*)     Alkaline Phosphatase 87      AST 19      ALT 31      Protein Total 6.9      Albumin 4.3      Bilirubin Total 0.2     GLUCOSE BY METER - Abnormal    GLUCOSE BY METER POCT 260 (*)    TROPONIN T, HIGH SENSITIVITY - Normal    Troponin T, High Sensitivity 8     TROPONIN T, HIGH SENSITIVITY - Normal    Troponin T, High Sensitivity 11     CBC WITH PLATELETS AND DIFFERENTIAL    WBC Count 9.6      RBC Count 4.72      Hemoglobin 14.2      Hematocrit 40.7      MCV 86      MCH 30.1      MCHC 34.9      RDW 12.4      Platelet Count 271      % Neutrophils 66      % Lymphocytes 24      % Monocytes 8      % Eosinophils 2      % Basophils 1      % Immature Granulocytes 0      NRBCs per 100 WBC 0      Absolute Neutrophils 6.3      Absolute Lymphocytes 2.3      Absolute Monocytes 0.8       Absolute Eosinophils 0.1      Absolute Basophils 0.1      Absolute Immature Granulocytes 0.0      Absolute NRBCs 0.0         Imaging   Chest XR,  PA & LAT   Final Result   IMPRESSION: Negative chest.          EKG     Sinus rhythm.  Normal EKG.  Resolution of T wave inversion on as compared to prior, dated 3 /14/23.  Rate 79 bpm. IN interval 184 ms. QRS duration 92 ms. QT/QTc 390/447 ms.       Independent Interpretation   CXR: No pneumothorax.    ED Course      Medications Administered   Medications   ondansetron (ZOFRAN) injection 4 mg (4 mg Intravenous $Given 7/24/24 0402)       Medical Decision Making / Diagnosis         ERICA Xavier is a 54 year old male who presents complaining of epigastric pain.  He does have a cardiac history and I was most concerned for cardiac etiology.  His description of his symptoms today are only mildly suspicious as he describes his pain is different in character, quality, location than his prior cardiac chest pain.  Troponin as well as a repeat high-sensitivity troponin were reassuring as well.  Chest x-ray reassuring.  I did discuss the option of staying for further evaluation versus discharge home.  His symptoms have fully resolved and his heart score is 3.  He is comfortable with plan for outpatient follow-up and will contact his cardiologist in the morning.  He will be seen this week.  He will return to the emergency department right away with any recurring symptoms.    Disposition   The patient was discharged.     Diagnosis     ICD-10-CM    1. Epigastric pain  R10.13                   Ashley Cristobal MD  07/25/24 0544

## 2024-07-25 NOTE — ED TRIAGE NOTES
Pt c/o Chest pain starting around 2030. Pt states he was playing soccer when he developed this CP. Pt describes the CP as 4/10 L anterior pain. Pt hx MI     Triage Assessment (Adult)       Row Name 07/24/24 0755          Triage Assessment    Airway WDL WDL        Respiratory WDL    Respiratory WDL WDL        Skin Circulation/Temperature WDL    Skin Circulation/Temperature WDL WDL        Cardiac WDL    Cardiac WDL WDL        Peripheral/Neurovascular WDL    Peripheral Neurovascular WDL WDL        Cognitive/Neuro/Behavioral WDL    Cognitive/Neuro/Behavioral WDL WDL

## 2024-07-25 NOTE — ED TRIAGE NOTES
Triage Assessment (Adult)       Row Name 07/24/24 7360          Triage Assessment    Airway WDL WDL        Respiratory WDL    Respiratory WDL WDL        Skin Circulation/Temperature WDL    Skin Circulation/Temperature WDL WDL        Cardiac WDL    Cardiac WDL WDL        Peripheral/Neurovascular WDL    Peripheral Neurovascular WDL WDL        Cognitive/Neuro/Behavioral WDL    Cognitive/Neuro/Behavioral WDL WDL

## 2024-09-21 ENCOUNTER — HEALTH MAINTENANCE LETTER (OUTPATIENT)
Age: 54
End: 2024-09-21

## 2025-01-12 ENCOUNTER — HEALTH MAINTENANCE LETTER (OUTPATIENT)
Age: 55
End: 2025-01-12

## 2025-04-05 ENCOUNTER — HEALTH MAINTENANCE LETTER (OUTPATIENT)
Age: 55
End: 2025-04-05

## (undated) RX ORDER — FENTANYL CITRATE 50 UG/ML
INJECTION, SOLUTION INTRAMUSCULAR; INTRAVENOUS
Status: DISPENSED
Start: 2017-10-24